# Patient Record
Sex: MALE | Race: OTHER | NOT HISPANIC OR LATINO | URBAN - METROPOLITAN AREA
[De-identification: names, ages, dates, MRNs, and addresses within clinical notes are randomized per-mention and may not be internally consistent; named-entity substitution may affect disease eponyms.]

---

## 2020-10-11 ENCOUNTER — INPATIENT (INPATIENT)
Facility: HOSPITAL | Age: 85
LOS: 4 days | Discharge: HOME | End: 2020-10-16
Attending: INTERNAL MEDICINE | Admitting: INTERNAL MEDICINE
Payer: MEDICARE

## 2020-10-11 VITALS
TEMPERATURE: 97 F | WEIGHT: 139.99 LBS | RESPIRATION RATE: 16 BRPM | HEART RATE: 71 BPM | OXYGEN SATURATION: 97 % | SYSTOLIC BLOOD PRESSURE: 151 MMHG | DIASTOLIC BLOOD PRESSURE: 69 MMHG

## 2020-10-11 LAB
ALBUMIN SERPL ELPH-MCNC: 4.1 G/DL — SIGNIFICANT CHANGE UP (ref 3.5–5.2)
ALP SERPL-CCNC: 69 U/L — SIGNIFICANT CHANGE UP (ref 30–115)
ALT FLD-CCNC: 7 U/L — SIGNIFICANT CHANGE UP (ref 0–41)
ANION GAP SERPL CALC-SCNC: 12 MMOL/L — SIGNIFICANT CHANGE UP (ref 7–14)
AST SERPL-CCNC: 14 U/L — SIGNIFICANT CHANGE UP (ref 0–41)
BASOPHILS # BLD AUTO: 0.04 K/UL — SIGNIFICANT CHANGE UP (ref 0–0.2)
BASOPHILS NFR BLD AUTO: 0.5 % — SIGNIFICANT CHANGE UP (ref 0–1)
BILIRUB SERPL-MCNC: 0.6 MG/DL — SIGNIFICANT CHANGE UP (ref 0.2–1.2)
BUN SERPL-MCNC: 27 MG/DL — HIGH (ref 10–20)
CALCIUM SERPL-MCNC: 9.2 MG/DL — SIGNIFICANT CHANGE UP (ref 8.5–10.1)
CHLORIDE SERPL-SCNC: 106 MMOL/L — SIGNIFICANT CHANGE UP (ref 98–110)
CO2 SERPL-SCNC: 23 MMOL/L — SIGNIFICANT CHANGE UP (ref 17–32)
CREAT SERPL-MCNC: 1.9 MG/DL — HIGH (ref 0.7–1.5)
EOSINOPHIL # BLD AUTO: 0.07 K/UL — SIGNIFICANT CHANGE UP (ref 0–0.7)
EOSINOPHIL NFR BLD AUTO: 0.8 % — SIGNIFICANT CHANGE UP (ref 0–8)
GLUCOSE SERPL-MCNC: 101 MG/DL — HIGH (ref 70–99)
HCT VFR BLD CALC: 42.5 % — SIGNIFICANT CHANGE UP (ref 42–52)
HGB BLD-MCNC: 14.2 G/DL — SIGNIFICANT CHANGE UP (ref 14–18)
IMM GRANULOCYTES NFR BLD AUTO: 0.6 % — HIGH (ref 0.1–0.3)
LYMPHOCYTES # BLD AUTO: 1.73 K/UL — SIGNIFICANT CHANGE UP (ref 1.2–3.4)
LYMPHOCYTES # BLD AUTO: 20.6 % — SIGNIFICANT CHANGE UP (ref 20.5–51.1)
MAGNESIUM SERPL-MCNC: 2.2 MG/DL — SIGNIFICANT CHANGE UP (ref 1.8–2.4)
MCHC RBC-ENTMCNC: 30 PG — SIGNIFICANT CHANGE UP (ref 27–31)
MCHC RBC-ENTMCNC: 33.4 G/DL — SIGNIFICANT CHANGE UP (ref 32–37)
MCV RBC AUTO: 89.7 FL — SIGNIFICANT CHANGE UP (ref 80–94)
MONOCYTES # BLD AUTO: 0.72 K/UL — HIGH (ref 0.1–0.6)
MONOCYTES NFR BLD AUTO: 8.6 % — SIGNIFICANT CHANGE UP (ref 1.7–9.3)
NEUTROPHILS # BLD AUTO: 5.8 K/UL — SIGNIFICANT CHANGE UP (ref 1.4–6.5)
NEUTROPHILS NFR BLD AUTO: 68.9 % — SIGNIFICANT CHANGE UP (ref 42.2–75.2)
NRBC # BLD: 0 /100 WBCS — SIGNIFICANT CHANGE UP (ref 0–0)
NT-PROBNP SERPL-SCNC: 271 PG/ML — SIGNIFICANT CHANGE UP (ref 0–300)
PLATELET # BLD AUTO: 213 K/UL — SIGNIFICANT CHANGE UP (ref 130–400)
POTASSIUM SERPL-MCNC: 5.2 MMOL/L — HIGH (ref 3.5–5)
POTASSIUM SERPL-SCNC: 5.2 MMOL/L — HIGH (ref 3.5–5)
PROT SERPL-MCNC: 6.2 G/DL — SIGNIFICANT CHANGE UP (ref 6–8)
RBC # BLD: 4.74 M/UL — SIGNIFICANT CHANGE UP (ref 4.7–6.1)
RBC # FLD: 13.6 % — SIGNIFICANT CHANGE UP (ref 11.5–14.5)
SODIUM SERPL-SCNC: 141 MMOL/L — SIGNIFICANT CHANGE UP (ref 135–146)
TROPONIN T SERPL-MCNC: <0.01 NG/ML — SIGNIFICANT CHANGE UP
WBC # BLD: 8.41 K/UL — SIGNIFICANT CHANGE UP (ref 4.8–10.8)
WBC # FLD AUTO: 8.41 K/UL — SIGNIFICANT CHANGE UP (ref 4.8–10.8)

## 2020-10-11 PROCEDURE — 93010 ELECTROCARDIOGRAM REPORT: CPT

## 2020-10-11 PROCEDURE — 71250 CT THORAX DX C-: CPT | Mod: 26

## 2020-10-11 PROCEDURE — 99218: CPT | Mod: CS

## 2020-10-11 RX ORDER — TAMSULOSIN HYDROCHLORIDE 0.4 MG/1
0.8 CAPSULE ORAL AT BEDTIME
Refills: 0 | Status: DISCONTINUED | OUTPATIENT
Start: 2020-10-11 | End: 2020-10-14

## 2020-10-11 RX ORDER — ALPRAZOLAM 0.25 MG
0.5 TABLET ORAL ONCE
Refills: 0 | Status: DISCONTINUED | OUTPATIENT
Start: 2020-10-11 | End: 2020-10-11

## 2020-10-11 RX ORDER — ZOLPIDEM TARTRATE 10 MG/1
5 TABLET ORAL AT BEDTIME
Refills: 0 | Status: DISCONTINUED | OUTPATIENT
Start: 2020-10-11 | End: 2020-10-14

## 2020-10-11 RX ORDER — SODIUM CHLORIDE 9 MG/ML
1000 INJECTION INTRAMUSCULAR; INTRAVENOUS; SUBCUTANEOUS ONCE
Refills: 0 | Status: COMPLETED | OUTPATIENT
Start: 2020-10-11 | End: 2020-10-11

## 2020-10-11 RX ADMIN — SODIUM CHLORIDE 100 MILLILITER(S): 9 INJECTION INTRAMUSCULAR; INTRAVENOUS; SUBCUTANEOUS at 20:00

## 2020-10-11 NOTE — ED PROVIDER NOTE - ATTENDING CONTRIBUTION TO CARE
Patient states that he was at the dinner table with family, suddenly passed out, witnessed by family, no fall. no seizure like activity, no incontinence. Patient is asymptomatic in ED.   Vitals noted  Patient is awake, alert, o x 3, resting comfortably on the stretcher, is asymptomatic.   No external signs of trauma noted.   lungs: CTA  abd: +BS, NT, ND, soft  CNS: awake, alert, o x 3, no focal neurologic deficits  A/P: Syncope  labs, EKG, CXR  reevaluation

## 2020-10-11 NOTE — ED ADULT NURSE NOTE - OBJECTIVE STATEMENT
BIBA from home with granddaughter s/p syncopal episode. As per granddaughter, "he looked pale and started looking like he was going to pass out. He said he didn't and that he remembers everything. He said he was just sleeping." Denies LOC, fall, chest pain, SOB, f/n/v/d.

## 2020-10-11 NOTE — ED CDU PROVIDER INITIAL DAY NOTE - OBJECTIVE STATEMENT
86 yo M with PMHx of HTN, CAD s/p PCI x 3 on Plavix, BPH, and HLD presents to the ED BIB EMS for evaluation of syncope. Pt was sitting at dinner and suddenly passed out. According to family pt was unconscious for about 5 minutes and when he woke up he was confused for another few minutes. When EMS arrived pt was hypotensive to 70s. When pt arrived to ED he was at baseline and BP improved. Pt currently denies any complaints. Pt denies fever, chills, nausea, vomiting, abdominal pain, diarrhea, headache, dizziness, weakness, chest pain, SOB, back pain, trauma, urinary symptoms, cough, calf pain/swelling, recent travel, recent surgery.

## 2020-10-11 NOTE — ED PROVIDER NOTE - NS ED ROS FT
Review of Systems:  	•	CONSTITUTIONAL: no fever, no diaphoresis, no chills  	•	SKIN: no rash  	•	EYES: no eye pain, no blurry vision  	•	ENT: no change in hearing, no tinnitus   	•	RESPIRATORY: no shortness of breath, no cough  	•	CARDIAC: no chest pain, no palpitations  	•	GI: no abd pain, no nausea, no vomiting, no diarrhea  	•	GENITO-URINARY: no dysuria; no hematuria, no increased urinary frequency  	•	MUSCULOSKELETAL: no joint paint, no swelling, no redness  	•	NEUROLOGIC: +syncope, no head injury

## 2020-10-11 NOTE — ED CLERICAL - NSCLERICAL TASK_GEN_ALL_ED
Other Documents/OBS/MG Problem: Fall Risk (Adult)  Goal: Absence of Falls  Patient will demonstrate the desired outcomes by discharge/transition of care.   Outcome: Ongoing (interventions implemented as appropriate)  Bed in lowest position, bed rails up x3, call light within reach. Pt free from falls during shift.    Problem: Patient Care Overview  Goal: Plan of Care Review  Outcome: Ongoing (interventions implemented as appropriate)  Patient is AAO to self and place, vital signs have been stable, plan of care reviewed with patient. Will continue to monitor.    Problem: Pressure Ulcer Risk (Ovidio Scale) (Adult,Obstetrics,Pediatric)  Goal: Identify Related Risk Factors and Signs and Symptoms  Related risk factors and signs and symptoms are identified upon initiation of Human Response Clinical Practice Guideline (CPG)   Outcome: Ongoing (interventions implemented as appropriate)  Pt is able to position self in bed.

## 2020-10-11 NOTE — ED PROVIDER NOTE - OBJECTIVE STATEMENT
85 year old male, CAD s/p 3 stents, HTN, HDL, GERD, who presents s/p syncope. Patient w/ episode of syncope x1 hour PTA, as per family patient was at dinner table when he had +LOC, did not fall to ground. Episode lasting ~1 minute, no postictal period, no urinary/bowel incontinence. patient states he recalls sitting at dinner table followed by waking up on ground. No recent illness, CP, SOB, abd pain, nausea, vomiting, diarrhea. Patient in ED w/o complaints. Patient follows w cardiology in [place], last stress test >2 years ago.

## 2020-10-11 NOTE — ED CDU PROVIDER INITIAL DAY NOTE - PHYSICAL EXAMINATION
VITAL SIGNS: I have reviewed nursing notes and confirm.  CONSTITUTIONAL: Elderly male laying on stretcher in NAD.   SKIN: Skin exam is warm and dry, no acute rash.  HEAD: Normocephalic; atraumatic.  EYES: PERRL, EOM intact; conjunctiva and sclera clear.  ENT: No nasal discharge; airway clear.   NECK: Supple; non tender.  CARD: S1, S2 normal; no murmurs, gallops, or rubs. Regular rate and rhythm.  RESP: No wheezes, rales or rhonchi. Speaking in full sentences.   ABD: Normal bowel sounds; soft; non-distended; non-tender; No rebound or guarding.   EXT: Normal ROM. No calf TTP or swelling.   NEURO: Alert, oriented. Grossly unremarkable. No focal deficits. CN II-XII intact. No dysmetria. No ataxia. Sensation intact and equal throughout. Strength 5/5 throughout. Gait steady.

## 2020-10-11 NOTE — ED CDU PROVIDER INITIAL DAY NOTE - MEDICAL DECISION MAKING DETAILS
Patient to remain on continuous telemetry.  For repeat cardiac enzymes and repeat EKG.  Consider inpatient workup vs. continued EDOU stay.

## 2020-10-11 NOTE — ED CDU PROVIDER INITIAL DAY NOTE - NS ED ROS FT
Review of Systems  Constitutional:  No fever, chills.  Eyes:  No visual changes, eye pain, or discharge.  ENMT:  No hearing changes, pain, or discharge. No nasal congestion, discharge, or bleeding. No throat pain, swelling, or difficulty swallowing.  Cardiac:  No chest pain, palpitations, or edema. (+) syncope  Respiratory:  No dyspnea, cough. No hemoptysis.  GI:  No nausea, vomiting, diarrhea, or abdominal pain.   :  No dysuria, hematuria, frequency, or burning.   MS:  No back pain.  Skin:  No skin rash, pruritis, jaundice, or lesions.  Neuro:  No headache, dizziness, loss of sensation, or focal weakness.  No change in mental status.

## 2020-10-11 NOTE — ED PROVIDER NOTE - CLINICAL SUMMARY MEDICAL DECISION MAKING FREE TEXT BOX
Patient remained asymptomatic in ED, remained in NSR. Patient is admitted to EDOU under 2 midnights for further evaluation of syncope.

## 2020-10-11 NOTE — ED PROVIDER NOTE - PHYSICAL EXAMINATION
CONSTITUTIONAL: Elderly appearing M. Well-developed; well-nourished; in no acute distress, nontoxic appearing  SKIN: skin exam is warm and dry  HEAD: Normocephalic; atraumatic.  EYES: PERRL, conjunctiva and sclera clear.  ENT: MMM, no nasal congestion  NECK: Supple; non tender.  ROM intact.  CARD: S1, S2 normal, no murmur  RESP: No wheezes, rales or rhonchi. Good air movement bilaterally  ABD: soft; non-distended; non-tender. No Rebound, No guarding  EXT: Normal ROM.    NEURO: awake, alert, following commands, oriented, grossly unremarkable. No Focal deficits. GCS 15. CN 2-12 intact. Negative pronator.   PSYCH: Cooperative, appropriate.

## 2020-10-11 NOTE — ED ADULT NURSE NOTE - CHPI ED NUR SYMPTOMS NEG
no vomiting/no diaphoresis/no back pain/no congestion/no dizziness/no nausea/no shortness of breath/no chest pain/no chills/no fever

## 2020-10-12 DIAGNOSIS — Z95.5 PRESENCE OF CORONARY ANGIOPLASTY IMPLANT AND GRAFT: Chronic | ICD-10-CM

## 2020-10-12 LAB
A1C WITH ESTIMATED AVERAGE GLUCOSE RESULT: 5.8 % — HIGH (ref 4–5.6)
ANION GAP SERPL CALC-SCNC: 10 MMOL/L — SIGNIFICANT CHANGE UP (ref 7–14)
BUN SERPL-MCNC: 27 MG/DL — HIGH (ref 10–20)
CALCIUM SERPL-MCNC: 8.8 MG/DL — SIGNIFICANT CHANGE UP (ref 8.5–10.1)
CHLORIDE SERPL-SCNC: 107 MMOL/L — SIGNIFICANT CHANGE UP (ref 98–110)
CHOLEST SERPL-MCNC: 139 MG/DL — SIGNIFICANT CHANGE UP (ref 100–200)
CO2 SERPL-SCNC: 22 MMOL/L — SIGNIFICANT CHANGE UP (ref 17–32)
CREAT SERPL-MCNC: 1.5 MG/DL — SIGNIFICANT CHANGE UP (ref 0.7–1.5)
ESTIMATED AVERAGE GLUCOSE: 120 MG/DL — HIGH (ref 68–114)
GLUCOSE SERPL-MCNC: 95 MG/DL — SIGNIFICANT CHANGE UP (ref 70–99)
HDLC SERPL-MCNC: 39 MG/DL — LOW
LIPID PNL WITH DIRECT LDL SERPL: 94 MG/DL — SIGNIFICANT CHANGE UP (ref 4–129)
POTASSIUM SERPL-MCNC: 4.4 MMOL/L — SIGNIFICANT CHANGE UP (ref 3.5–5)
POTASSIUM SERPL-SCNC: 4.4 MMOL/L — SIGNIFICANT CHANGE UP (ref 3.5–5)
RAPID RVP RESULT: SIGNIFICANT CHANGE UP
SARS-COV-2 RNA SPEC QL NAA+PROBE: SIGNIFICANT CHANGE UP
SODIUM SERPL-SCNC: 139 MMOL/L — SIGNIFICANT CHANGE UP (ref 135–146)
TOTAL CHOLESTEROL/HDL RATIO MEASUREMENT: 3.6 RATIO — LOW (ref 4–5.5)
TRIGL SERPL-MCNC: 56 MG/DL — SIGNIFICANT CHANGE UP (ref 10–149)
TROPONIN T SERPL-MCNC: <0.01 NG/ML — SIGNIFICANT CHANGE UP

## 2020-10-12 PROCEDURE — 93306 TTE W/DOPPLER COMPLETE: CPT | Mod: 26

## 2020-10-12 PROCEDURE — 93010 ELECTROCARDIOGRAM REPORT: CPT

## 2020-10-12 PROCEDURE — 99217: CPT | Mod: CS

## 2020-10-12 RX ORDER — AMLODIPINE BESYLATE 2.5 MG/1
5 TABLET ORAL DAILY
Refills: 0 | Status: DISCONTINUED | OUTPATIENT
Start: 2020-10-12 | End: 2020-10-14

## 2020-10-12 RX ORDER — PANTOPRAZOLE SODIUM 20 MG/1
1 TABLET, DELAYED RELEASE ORAL
Qty: 0 | Refills: 0 | DISCHARGE

## 2020-10-12 RX ORDER — ALPRAZOLAM 0.25 MG
0.5 TABLET ORAL THREE TIMES A DAY
Refills: 0 | Status: DISCONTINUED | OUTPATIENT
Start: 2020-10-12 | End: 2020-10-14

## 2020-10-12 RX ORDER — METOPROLOL TARTRATE 50 MG
1 TABLET ORAL
Qty: 0 | Refills: 0 | DISCHARGE

## 2020-10-12 RX ORDER — OMEGA-3 ACID ETHYL ESTERS 1 G
2 CAPSULE ORAL
Refills: 0 | Status: DISCONTINUED | OUTPATIENT
Start: 2020-10-12 | End: 2020-10-16

## 2020-10-12 RX ORDER — FOLIC ACID 0.8 MG
1 TABLET ORAL
Qty: 0 | Refills: 0 | DISCHARGE

## 2020-10-12 RX ORDER — CLOPIDOGREL BISULFATE 75 MG/1
75 TABLET, FILM COATED ORAL DAILY
Refills: 0 | Status: DISCONTINUED | OUTPATIENT
Start: 2020-10-12 | End: 2020-10-16

## 2020-10-12 RX ORDER — PANTOPRAZOLE SODIUM 20 MG/1
40 TABLET, DELAYED RELEASE ORAL
Refills: 0 | Status: DISCONTINUED | OUTPATIENT
Start: 2020-10-12 | End: 2020-10-14

## 2020-10-12 RX ORDER — FOLIC ACID 0.8 MG
1 TABLET ORAL DAILY
Refills: 0 | Status: DISCONTINUED | OUTPATIENT
Start: 2020-10-12 | End: 2020-10-14

## 2020-10-12 RX ORDER — CLOPIDOGREL BISULFATE 75 MG/1
1 TABLET, FILM COATED ORAL
Qty: 0 | Refills: 0 | DISCHARGE

## 2020-10-12 RX ORDER — LOSARTAN POTASSIUM 100 MG/1
1 TABLET, FILM COATED ORAL
Qty: 0 | Refills: 0 | DISCHARGE

## 2020-10-12 RX ORDER — OMEGA-3 ACID ETHYL ESTERS 1 G
1 CAPSULE ORAL
Qty: 0 | Refills: 0 | DISCHARGE

## 2020-10-12 RX ORDER — ATORVASTATIN CALCIUM 80 MG/1
1 TABLET, FILM COATED ORAL
Qty: 0 | Refills: 0 | DISCHARGE

## 2020-10-12 RX ORDER — METOPROLOL TARTRATE 50 MG
50 TABLET ORAL DAILY
Refills: 0 | Status: DISCONTINUED | OUTPATIENT
Start: 2020-10-12 | End: 2020-10-16

## 2020-10-12 RX ORDER — SODIUM CHLORIDE 9 MG/ML
1000 INJECTION INTRAMUSCULAR; INTRAVENOUS; SUBCUTANEOUS ONCE
Refills: 0 | Status: COMPLETED | OUTPATIENT
Start: 2020-10-12 | End: 2020-10-12

## 2020-10-12 RX ORDER — CITALOPRAM 10 MG/1
1 TABLET, FILM COATED ORAL
Qty: 0 | Refills: 0 | DISCHARGE

## 2020-10-12 RX ORDER — SODIUM CHLORIDE 9 MG/ML
1000 INJECTION INTRAMUSCULAR; INTRAVENOUS; SUBCUTANEOUS
Refills: 0 | Status: DISCONTINUED | OUTPATIENT
Start: 2020-10-12 | End: 2020-10-15

## 2020-10-12 RX ORDER — CITALOPRAM 10 MG/1
20 TABLET, FILM COATED ORAL DAILY
Refills: 0 | Status: DISCONTINUED | OUTPATIENT
Start: 2020-10-12 | End: 2020-10-16

## 2020-10-12 RX ORDER — CHLORHEXIDINE GLUCONATE 213 G/1000ML
1 SOLUTION TOPICAL
Refills: 0 | Status: DISCONTINUED | OUTPATIENT
Start: 2020-10-12 | End: 2020-10-16

## 2020-10-12 RX ORDER — HEPARIN SODIUM 5000 [USP'U]/ML
5000 INJECTION INTRAVENOUS; SUBCUTANEOUS EVERY 8 HOURS
Refills: 0 | Status: DISCONTINUED | OUTPATIENT
Start: 2020-10-12 | End: 2020-10-16

## 2020-10-12 RX ORDER — ATORVASTATIN CALCIUM 80 MG/1
10 TABLET, FILM COATED ORAL AT BEDTIME
Refills: 0 | Status: DISCONTINUED | OUTPATIENT
Start: 2020-10-12 | End: 2020-10-16

## 2020-10-12 RX ADMIN — Medication 1 MILLIGRAM(S): at 16:39

## 2020-10-12 RX ADMIN — Medication 50 MILLIGRAM(S): at 16:39

## 2020-10-12 RX ADMIN — SODIUM CHLORIDE 500 MILLILITER(S): 9 INJECTION INTRAMUSCULAR; INTRAVENOUS; SUBCUTANEOUS at 01:55

## 2020-10-12 RX ADMIN — CLOPIDOGREL BISULFATE 75 MILLIGRAM(S): 75 TABLET, FILM COATED ORAL at 16:39

## 2020-10-12 RX ADMIN — ZOLPIDEM TARTRATE 5 MILLIGRAM(S): 10 TABLET ORAL at 00:38

## 2020-10-12 RX ADMIN — TAMSULOSIN HYDROCHLORIDE 0.8 MILLIGRAM(S): 0.4 CAPSULE ORAL at 22:32

## 2020-10-12 RX ADMIN — ZOLPIDEM TARTRATE 5 MILLIGRAM(S): 10 TABLET ORAL at 22:32

## 2020-10-12 RX ADMIN — Medication 0.5 MILLIGRAM(S): at 00:38

## 2020-10-12 RX ADMIN — CITALOPRAM 20 MILLIGRAM(S): 10 TABLET, FILM COATED ORAL at 16:40

## 2020-10-12 RX ADMIN — Medication 0.5 MILLIGRAM(S): at 19:46

## 2020-10-12 RX ADMIN — ATORVASTATIN CALCIUM 10 MILLIGRAM(S): 80 TABLET, FILM COATED ORAL at 22:33

## 2020-10-12 RX ADMIN — AMLODIPINE BESYLATE 5 MILLIGRAM(S): 2.5 TABLET ORAL at 16:39

## 2020-10-12 RX ADMIN — Medication 0.5 MILLIGRAM(S): at 22:33

## 2020-10-12 RX ADMIN — TAMSULOSIN HYDROCHLORIDE 0.8 MILLIGRAM(S): 0.4 CAPSULE ORAL at 00:38

## 2020-10-12 NOTE — H&P ADULT - HISTORY OF PRESENT ILLNESS
85 year old male, CAD s/p 3 stents, HTN, HDL, emphysema, asbestos, exposure, GERD, who presents s/p syncope 19:00 on 10/11. Per patient he was sitting at dinner table and lost consciousness for 1 minute. He had no preceding symptoms at all, and when he woke up he felt "weak" with no other symptoms. He states the EMTs said his "blood pressure was low". He recalls sitting at table and then waking up surrounded by people. His family witnessed the whole episode; they state he suddenly did not answer them and slumped in his chair. They report no seizure activity, posttictal state, no urinary/bowel incontinence.   Of note. he reports another episode of dizziness that he felt yesterday afternoon that resolved spontaneously after 2 minutes. No recent illness, CP, SOB, abd pain, nausea, vomiting, diarrhea. Patient in ED w/o complaints. Patient follows w cardiology in , last stress test >2 years ago. 85 year old male, CAD s/p 3 stents, HTN, HDL, emphysema, asbestos, exposure, GERD, depression, who presents s/p syncope 19:00 on 10/11. Per patient he was sitting at dinner table and lost consciousness for 1 minute. He had no preceding symptoms at all, and when he woke up he felt "weak" with no other symptoms. He states the EMTs said his "blood pressure was low". He recalls sitting at table and then waking up surrounded by people. His family witnessed the whole episode; they state he suddenly did not answer them and slumped in his chair. They report no seizure activity, posttictal state, no urinary/bowel incontinence.   Of note, he reports another episode of dizziness that he felt yesterday afternoon that resolved spontaneously after 2 minutes.   He denies recent illness, CP, SOB, abd pain, nausea, vomiting, diarrhea at any time.    He has had 3 stents; the latest was mid 2018. He sees a cardiologist in [facility] who he does not remember the name; the last time he saw him was in February or March.    ED course: Vitals remained WNL. EKGs showed NSR. CT showed emphysema and asbestos plaques. He received 2 L NS and is admitted for syncopal w/u. 85 year old male, CAD s/p 3 stents, HTN, HDL, emphysema, asbestos exposure, GERD, depression, who presents s/p syncope 19:00 on 10/11. Per patient he was sitting at dinner table and lost consciousness for 1 minute. He had no preceding symptoms at all, and when he woke up he felt "weak" with no other symptoms. He states the EMTs said his "blood pressure was low". He recalls sitting at table and then waking up surrounded by people. His family witnessed the whole episode; they state he suddenly did not answer them and slumped in his chair. They report no seizure activity, posttictal state, no urinary/bowel incontinence.   Of note, he reports another episode of dizziness that he felt yesterday afternoon that resolved spontaneously after 2 minutes.   He denies recent illness, CP, SOB, abd pain, nausea, vomiting, diarrhea at any time.    He has had 3 stents; the latest was mid 2018. He sees a cardiologist in [facility] who he does not remember the name; the last time he saw him was in February or March.    ED course: Vitals remained WNL. EKGs showed NSR. CT showed emphysema and asbestos plaques. He received 2 L NS and is admitted for syncopal w/u.

## 2020-10-12 NOTE — ED CDU PROVIDER DISPOSITION NOTE - CLINICAL COURSE
85 year old male, CAD s/p 3 stents, HTN, HDL, GERD, who presents s/p episode of non-exertional syncope. Patient had witnessed episode of syncope at dinner table, passed out for several minutes, no prodromal symptoms. No injuries/trauma. Episode lasted about 1 minute, no postictal period, no urinary/bowel incontinence. Patient states he recalls sitting at dinner table followed by waking up on ground. No recent illness, CP, SOB, abd pain, nausea, vomiting, diarrhea. Patient in ED w/o complaints. Patient follows w cardiology in , last stress test >2 years ago. Had mildly elevated Cr of 1.9, downtrended to 1.5 after IVF. Serial trops negative, EKG with no acute interval changes. Echo showed LVH and sclerotic aortic valve. Hemodynamically stable, currently asymptomatic. Informed patient's granddaughter Louisa of all lab/radiology results, she acted as  as patient is predominantly [place] speaking.

## 2020-10-12 NOTE — H&P ADULT - ATTENDING COMMENTS
Patient seen and examined independently. Agree with resident note/ history / physical exam and plan of care with following exceptions/additions/updates. Case discussed with patient/pt decision maker, house-staff and nursing.     pt wants his granddaughter Ana ( she is an RN ) to translate for him, does not want  phone. called ana on the phone.   pt is feeling better. no pain or dizziness  dw EP,  plan is cardio consult, cont tele, and loop recorder prior to dc ( probably in am ) ,

## 2020-10-12 NOTE — H&P ADULT - ASSESSMENT
85 year old male, CAD s/p 3 stents, HTN, HDL, emphysema, asbestos, exposure, GERD, who presents s/p syncope 19:00 on 10/11. Per patient he was sitting at dinner table and lost consciousness for 1 minute. He had no preceding symptoms at all, and when he woke up he felt "weak" with no other symptoms. He states the EMTs said his "blood pressure was low". He recalls sitting at table and then waking up surrounded by people. His family witnessed the whole episode; they state he suddenly did not answer them and slumped in his chair. They report no seizure activity, posttictal state, no urinary/bowel incontinence.   Of note, he reports another episode of dizziness that he felt yesterday afternoon that resolved spontaneously after 2 minutes.   He denies recent illness, CP, SOB, abd pain, nausea, vomiting, diarrhea at any time.    ED course: Vitals remained WNL. EKGs showed NSR. CT showed emphysema and asbestos plaques. He received 2 L NS and is admitted for syncopal w/u. 85 year old male, CAD s/p 3 stents, HTN, HDL, emphysema, asbestos exposure, GERD, depression, who presents s/p syncope 19:00 on 10/11. he had no associated symptoms aside for a short episode of dizziness a few hours prior; episode was fully witnessed by family who reported no seizure-like activity.  In ED EKGs showed NSR. CT showed emphysema and asbestos plaques. He received 2 L NS and was admitted for syncopal w/u.      # Syncope. Given lack of associated symptoms and significant cardiac history (3 stents, HLD, HTN) most likely cardiac etiology  - 3 EKGs show NSR, 2 negative trops  - Obtain lipid panel, HA1C  - Admit to tele  - EP consult for possible loop recorder  - Will hold off on EEG as episode was fully witnessed with no signs of seizure activity, plus extensive cardiac history    # FORREST  - On admission Cr was 1.9, after 2 L NS now 1.5; given improvement after fluids likely prerenal 2/2 dehydration  - Will start light hydration  - Hold off on nephrotoxic drugs (e.g. losartan)  - Will monitor    # HTN  - Continue toprol, amlodipine.   - Hold losartan until FORREST resolves    # ACS  - Continue Plavix    # HDL  - Continue atorvastatin    # Depression  - Continue citalopram, lorazepam. Continue Xanax PRN    # GERD  - Continue PPI    # Emphysema  - Radiology recommends f/u CT in 3 months if no prior CT      DVT PPX: Heparin subq  GI PPX: protonix  Diet: DASH  Dispo: From home, pending EP eval 85 year old male, CAD s/p 3 stents, HTN, HDL, emphysema, asbestos exposure, GERD, depression, who presents s/p syncope 19:00 on 10/11. he had no associated symptoms aside for a short episode of dizziness a few hours prior; episode was fully witnessed by family who reported no seizure-like activity.  In ED EKGs showed NSR. CT showed emphysema and asbestos plaques. He received 2 L NS and was admitted for syncopal w/u.      # Syncope. Given lack of associated symptoms and significant cardiac history (3 stents, HLD, HTN) most likely cardiac etiology  - 3 EKGs show NSR, 2 negative trops  - Obtain lipid panel, HA1C  - Admit to tele  - EP consult for possible loop recorder  - Obtain orthostatics  - Will hold off on EEG as episode was fully witnessed with no signs of seizure activity, plus extensive cardiac history    # FORREST  - On admission Cr was 1.9, after 2 L NS now 1.5; given improvement after fluids likely prerenal 2/2 dehydration  - Will start light hydration  - Hold off on nephrotoxic drugs (e.g. losartan)  - Will monitor    # HTN  - Continue toprol, amlodipine.   - Hold losartan until FORREST resolves    # ACS  - Continue Plavix    # HDL  - Continue atorvastatin    # Depression  - Continue citalopram, lorazepam. Continue Xanax PRN    # GERD  - Continue PPI    # Emphysema  - Radiology recommends f/u CT in 3 months if no prior CT      DVT PPX: Heparin subq  GI PPX: protonix  Diet: DASH  Dispo: From home, pending EP eval 85 year old male, CAD s/p 3 stents, HTN, HDL, emphysema, asbestos exposure, GERD, depression, who presents s/p syncope 19:00 on 10/11. he had no associated symptoms aside for a short episode of dizziness a few hours prior; episode was fully witnessed by family who reported no seizure-like activity.  In ED EKGs showed NSR. CT showed emphysema and asbestos plaques. He received 2 L NS and was admitted for syncopal w/u.      # Syncope. Given lack of associated symptoms and significant cardiac history (3 stents, HLD, HTN) most likely cardiac etiology  - 3 EKGs show NSR, 2 negative trops  - Echo showed 60-65% EF with grade I diastolic dysfunction  - Obtain lipid panel, HA1C  - Admit to tele  - EP consult for possible loop recorder  - Obtain orthostatics  - Will hold off on EEG as episode was fully witnessed with no signs of seizure activity, plus extensive cardiac history    # FORREST  - On admission Cr was 1.9, after 2 L NS now 1.5; given improvement after fluids likely prerenal 2/2 dehydration  - Will start light hydration  - Hold off on nephrotoxic drugs (e.g. losartan)  - Will monitor    # HTN  - Continue toprol, amlodipine.   - Hold losartan until FORREST resolves    # ACS  - Continue Plavix    # HDL  - Continue atorvastatin    # Depression  - Continue citalopram, lorazepam. Continue Xanax PRN    # GERD  - Continue PPI    # Emphysema  - Radiology recommends f/u CT in 3 months if no prior CT      DVT PPX: Heparin subq  GI PPX: protonix  Diet: DASH  Dispo: From home, pending EP eval

## 2020-10-12 NOTE — H&P ADULT - NSHPSOCIALHISTORY_GEN_ALL_CORE
Quit smoking 30 years ago  Drinks socially  No illicit drug use  Lives at home with wife, where he is completely functional

## 2020-10-12 NOTE — H&P ADULT - NSHPPHYSICALEXAM_GEN_ALL_CORE
Vital Signs Last 24 Hrs  T(C): 36.6 (12 Oct 2020 07:27), Max: 36.6 (12 Oct 2020 07:27)  T(F): 97.8 (12 Oct 2020 07:27), Max: 97.8 (12 Oct 2020 07:27)  HR: 71 (12 Oct 2020 07:27) (71 - 82)  BP: 162/70 (12 Oct 2020 07:27) (151/69 - 162/70)  RR: 16 (11 Oct 2020 17:50) (16 - 16)  SpO2: 97% (11 Oct 2020 17:50) (97% - 97%)        GENERAL: NAD, lying in bed comfortably  HEAD:  Atraumatic, Normocephalic  EYES: EOMI, PERRLA, conjunctiva and sclera clear  ENT: Moist mucous membranes  CHEST/LUNG: Clear to auscultation bilaterally; No rales, rhonchi, wheezing, or rubs. Unlabored respirations  HEART: Regular rate and rhythm; No murmurs, rubs, or gallops  ABDOMEN: Bowel sounds present; Soft, Nontender, Nondistended. No hepatomegally  EXTREMITIES:  2+ Peripheral Pulses, brisk capillary refill. No clubbing, cyanosis, or edema  NERVOUS SYSTEM:  Alert & Oriented X3, speech clear. No deficits   MSK: FROM all 4 extremities, full and equal strength

## 2020-10-12 NOTE — H&P ADULT - NSHPLABSRESULTS_GEN_ALL_CORE
LABS:  cret                        14.2   8.41  )-----------( 213      ( 11 Oct 2020 18:41 )             42.5     10-12    139  |  107  |  27<H>  ----------------------------<  95  4.4   |  22  |  1.5    Ca    8.8      12 Oct 2020 06:35  Mg     2.2     10-11    TPro  6.2  /  Alb  4.1  /  TBili  0.6  /  DBili  x   /  AST  14  /  ALT  7   /  AlkPhos  69  10-11            < from: 12 Lead ECG (10.12.20 @ 01:50) >    Diagnosis Line Normal sinus rhythm  Normal ECG    < end of copied text >      < from: CT Chest No Cont (10.11.20 @ 21:43) >    IMPRESSION:    No pneumothorax.  Centrilobular emphysema.  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  Curvilinear opacity/consolidative change in the left lower lobe has the appearance favoring rounded atelectasis. Given no prior studies for comparison, 3 month follow-up is recommended to ensure stability.  Main pulmonary artery dilated to 3.4 cm which can be seen in pulmonary artery hypertension.  No additional evidence for focal opacity or acute pathology.    < end of copied text >

## 2020-10-12 NOTE — H&P ADULT - NSICDXPASTMEDICALHX_GEN_ALL_CORE_FT
PAST MEDICAL HISTORY:  Chronic GERD     Coronary artery disease     Depression     Hyperlipidemia, group A     Hypertension

## 2020-10-13 LAB
ANION GAP SERPL CALC-SCNC: 9 MMOL/L — SIGNIFICANT CHANGE UP (ref 7–14)
BASOPHILS # BLD AUTO: 0.07 K/UL — SIGNIFICANT CHANGE UP (ref 0–0.2)
BASOPHILS NFR BLD AUTO: 0.9 % — SIGNIFICANT CHANGE UP (ref 0–1)
BUN SERPL-MCNC: 22 MG/DL — HIGH (ref 10–20)
CALCIUM SERPL-MCNC: 8.4 MG/DL — LOW (ref 8.5–10.1)
CHLORIDE SERPL-SCNC: 107 MMOL/L — SIGNIFICANT CHANGE UP (ref 98–110)
CO2 SERPL-SCNC: 22 MMOL/L — SIGNIFICANT CHANGE UP (ref 17–32)
CREAT SERPL-MCNC: 1.3 MG/DL — SIGNIFICANT CHANGE UP (ref 0.7–1.5)
EOSINOPHIL # BLD AUTO: 0.14 K/UL — SIGNIFICANT CHANGE UP (ref 0–0.7)
EOSINOPHIL NFR BLD AUTO: 1.7 % — SIGNIFICANT CHANGE UP (ref 0–8)
GLUCOSE SERPL-MCNC: 100 MG/DL — HIGH (ref 70–99)
HCT VFR BLD CALC: 38.7 % — LOW (ref 42–52)
HGB BLD-MCNC: 13.1 G/DL — LOW (ref 14–18)
IMM GRANULOCYTES NFR BLD AUTO: 0.5 % — HIGH (ref 0.1–0.3)
LYMPHOCYTES # BLD AUTO: 1.64 K/UL — SIGNIFICANT CHANGE UP (ref 1.2–3.4)
LYMPHOCYTES # BLD AUTO: 20 % — LOW (ref 20.5–51.1)
MCHC RBC-ENTMCNC: 30 PG — SIGNIFICANT CHANGE UP (ref 27–31)
MCHC RBC-ENTMCNC: 33.9 G/DL — SIGNIFICANT CHANGE UP (ref 32–37)
MCV RBC AUTO: 88.6 FL — SIGNIFICANT CHANGE UP (ref 80–94)
MONOCYTES # BLD AUTO: 0.75 K/UL — HIGH (ref 0.1–0.6)
MONOCYTES NFR BLD AUTO: 9.1 % — SIGNIFICANT CHANGE UP (ref 1.7–9.3)
NEUTROPHILS # BLD AUTO: 5.57 K/UL — SIGNIFICANT CHANGE UP (ref 1.4–6.5)
NEUTROPHILS NFR BLD AUTO: 67.8 % — SIGNIFICANT CHANGE UP (ref 42.2–75.2)
NRBC # BLD: 0 /100 WBCS — SIGNIFICANT CHANGE UP (ref 0–0)
PLATELET # BLD AUTO: 198 K/UL — SIGNIFICANT CHANGE UP (ref 130–400)
POTASSIUM SERPL-MCNC: 3.9 MMOL/L — SIGNIFICANT CHANGE UP (ref 3.5–5)
POTASSIUM SERPL-SCNC: 3.9 MMOL/L — SIGNIFICANT CHANGE UP (ref 3.5–5)
RBC # BLD: 4.37 M/UL — LOW (ref 4.7–6.1)
RBC # FLD: 13.6 % — SIGNIFICANT CHANGE UP (ref 11.5–14.5)
SODIUM SERPL-SCNC: 138 MMOL/L — SIGNIFICANT CHANGE UP (ref 135–146)
WBC # BLD: 8.21 K/UL — SIGNIFICANT CHANGE UP (ref 4.8–10.8)
WBC # FLD AUTO: 8.21 K/UL — SIGNIFICANT CHANGE UP (ref 4.8–10.8)

## 2020-10-13 PROCEDURE — 99223 1ST HOSP IP/OBS HIGH 75: CPT

## 2020-10-13 RX ADMIN — Medication 2 GRAM(S): at 06:36

## 2020-10-13 RX ADMIN — AMLODIPINE BESYLATE 5 MILLIGRAM(S): 2.5 TABLET ORAL at 06:36

## 2020-10-13 RX ADMIN — Medication 50 MILLIGRAM(S): at 06:36

## 2020-10-13 RX ADMIN — SODIUM CHLORIDE 50 MILLILITER(S): 9 INJECTION INTRAMUSCULAR; INTRAVENOUS; SUBCUTANEOUS at 06:37

## 2020-10-13 RX ADMIN — Medication 2 GRAM(S): at 17:42

## 2020-10-13 RX ADMIN — Medication 0.5 MILLIGRAM(S): at 22:43

## 2020-10-13 RX ADMIN — CITALOPRAM 20 MILLIGRAM(S): 10 TABLET, FILM COATED ORAL at 11:26

## 2020-10-13 RX ADMIN — CLOPIDOGREL BISULFATE 75 MILLIGRAM(S): 75 TABLET, FILM COATED ORAL at 11:23

## 2020-10-13 RX ADMIN — HEPARIN SODIUM 5000 UNIT(S): 5000 INJECTION INTRAVENOUS; SUBCUTANEOUS at 06:36

## 2020-10-13 RX ADMIN — Medication 1 MILLIGRAM(S): at 11:26

## 2020-10-13 RX ADMIN — TAMSULOSIN HYDROCHLORIDE 0.8 MILLIGRAM(S): 0.4 CAPSULE ORAL at 22:44

## 2020-10-13 RX ADMIN — ZOLPIDEM TARTRATE 5 MILLIGRAM(S): 10 TABLET ORAL at 22:43

## 2020-10-13 RX ADMIN — ATORVASTATIN CALCIUM 10 MILLIGRAM(S): 80 TABLET, FILM COATED ORAL at 22:44

## 2020-10-13 RX ADMIN — PANTOPRAZOLE SODIUM 40 MILLIGRAM(S): 20 TABLET, DELAYED RELEASE ORAL at 06:36

## 2020-10-13 RX ADMIN — HEPARIN SODIUM 5000 UNIT(S): 5000 INJECTION INTRAVENOUS; SUBCUTANEOUS at 22:45

## 2020-10-13 NOTE — CONSULT NOTE ADULT - ASSESSMENT
85 year old male, CAD s/p 3 stents, HTN, HDL, emphysema, asbestos exposure, GERD, depression, who presents s/p syncope 19:00 on 10/11. he had no associated symptoms aside for a short episode of dizziness a few hours prior; episode was fully witnessed by family who reported no seizure-like activity.    Impression:  Syncope  CAD sp PCI ( outside records)  HTN  HLD  Emphysema    Plan:  - Cont tele monitoring  - Please check orthostatic VS  - Check TSH, free T4  - no concern for ischemia / nl ECG AND negative troponins  - echo shows normal EF, no wall motion abnormalities   85 year old male, CAD s/p 3 stents, HTN, HDL, emphysema, asbestos exposure, GERD, depression, who presents s/p syncope 19:00 on 10/11. he had no associated symptoms aside for a short episode of dizziness a few hours prior; episode was fully witnessed by family who reported no seizure-like activity.    Impression:  Syncope rule out arrhythmia, conduction abnormalities  CAD sp PCI ( outside records)  HTN  HLD  Emphysema    Plan:  - continue with tele-monitoring  - please check orthostatic VS  - check TSH, free T4  - obtain records of cardiac cath from [facility]  - follow up EP for further evaluation

## 2020-10-13 NOTE — ED ADULT NURSE REASSESSMENT NOTE - NS ED NURSE REASSESS COMMENT FT1
Pt alert and oriented x 4 FF VSS with no complaints awaiting [place] [facility] [place] no further interventions at this time will continue to monitor and assess.
Pt received from previous RN. Pt sleeping comfortably, no distress noted. VS obtained and stable. Remains on continuous cardiac monitoring. Awaiting testing. Will continue to observe.
pt assessed, pt tolerating meals and meds, pt ambulated to bathroom, pt waiting for EP consult.

## 2020-10-13 NOTE — PROGRESS NOTE ADULT - SUBJECTIVE AND OBJECTIVE BOX
SUBJECTIVE:  Patient is a 85y old Male who presents with a chief complaint of syncope (13 Oct 2020 11:06)  Currently admitted to medicine with the primary diagnosis of Syncope  Today is hospital day 1d.   This morning he is resting comfortably in bed and reports no new issues or overnight events.       PAST MEDICAL & SURGICAL HISTORY  Chronic GERD    Hyperlipidemia, group A    Depression    Coronary artery disease    Hypertension    Presence of stent in coronary artery  x3        ALLERGIES:  No Known Allergies    MEDICATIONS:  STANDING MEDICATIONS  amLODIPine   Tablet 5 milliGRAM(s) Oral daily  atorvastatin 10 milliGRAM(s) Oral at bedtime  chlorhexidine 4% Liquid 1 Application(s) Topical <User Schedule>  citalopram 20 milliGRAM(s) Oral daily  clopidogrel Tablet 75 milliGRAM(s) Oral daily  folic acid 1 milliGRAM(s) Oral daily  heparin   Injectable 5000 Unit(s) SubCutaneous every 8 hours  LORazepam     Tablet 0.5 milliGRAM(s) Oral at bedtime  metoprolol succinate ER 50 milliGRAM(s) Oral daily  omega-3-Acid Ethyl Esters 2 Gram(s) Oral two times a day  pantoprazole    Tablet 40 milliGRAM(s) Oral before breakfast  sodium chloride 0.9%. 1000 milliLiter(s) IV Continuous <Continuous>  tamsulosin 0.8 milliGRAM(s) Oral at bedtime  zolpidem 5 milliGRAM(s) Oral at bedtime    PRN MEDICATIONS  ALPRAZolam 0.5 milliGRAM(s) Oral three times a day PRN    VITALS:   T(F): 98.5  HR: 66  BP: 154/71  RR: 18  SpO2: 98%    LABS:                        13.1   8.21  )-----------( 198      ( 13 Oct 2020 11:20 )             38.7     10-13    138  |  107  |  22<H>  ----------------------------<  100<H>  3.9   |  22  |  1.3    Ca    8.4<L>      13 Oct 2020 11:20  Mg     2.2     10-11    TPro  6.2  /  Alb  4.1  /  TBili  0.6  /  DBili  x   /  AST  14  /  ALT  7   /  AlkPhos  69  10-11              CARDIAC MARKERS ( 12 Oct 2020 06:35 )  x     / <0.01 ng/mL / x     / x     / x      CARDIAC MARKERS ( 11 Oct 2020 18:41 )  x     / <0.01 ng/mL / x     / x     / x          RADIOLOGY:    PHYSICAL EXAM:  GEN: No acute distress  LUNGS: Clear to auscultation bilaterally   HEART: S1/S2 present. RRR.   ABD: Soft, non-tender, non-distended. Bowel sounds present  EXT: NC/NC/NE/2+PP/QUINTERO/Skin Intact.   NEURO: AAOX3    Intravenous access:   NG tube:   Meyers cathter:        SUBJECTIVE:  Patient is a 85y old Male who presents with a chief complaint of syncope (13 Oct 2020 11:06)  Currently admitted to medicine with the primary diagnosis of Syncope  Today is hospital day 1d.   This morning he is resting comfortably in bed and reports no new issues or overnight events.   Patient seen with granddaughter bedside, provided translation    PAST MEDICAL & SURGICAL HISTORY  Chronic GERD  Hyperlipidemia, group A  Depression  Coronary artery disease  Hypertension  Presence of stent in coronary artery x3    ALLERGIES:  No Known Allergies    MEDICATIONS:  STANDING MEDICATIONS  amLODIPine   Tablet 5 milliGRAM(s) Oral daily  atorvastatin 10 milliGRAM(s) Oral at bedtime  chlorhexidine 4% Liquid 1 Application(s) Topical <User Schedule>  citalopram 20 milliGRAM(s) Oral daily  clopidogrel Tablet 75 milliGRAM(s) Oral daily  folic acid 1 milliGRAM(s) Oral daily  heparin   Injectable 5000 Unit(s) SubCutaneous every 8 hours  LORazepam     Tablet 0.5 milliGRAM(s) Oral at bedtime  metoprolol succinate ER 50 milliGRAM(s) Oral daily  omega-3-Acid Ethyl Esters 2 Gram(s) Oral two times a day  pantoprazole    Tablet 40 milliGRAM(s) Oral before breakfast  sodium chloride 0.9%. 1000 milliLiter(s) IV Continuous <Continuous>  tamsulosin 0.8 milliGRAM(s) Oral at bedtime  zolpidem 5 milliGRAM(s) Oral at bedtime    PRN MEDICATIONS  ALPRAZolam 0.5 milliGRAM(s) Oral three times a day PRN    VITALS:   T(F): 98.5  HR: 66  BP: 154/71  RR: 18  SpO2: 98%    LABS:                        13.1   8.21  )-----------( 198      ( 13 Oct 2020 11:20 )             38.7     10-13    138  |  107  |  22<H>  ----------------------------<  100<H>  3.9   |  22  |  1.3    Ca    8.4<L>      13 Oct 2020 11:20  Mg     2.2     10-11    TPro  6.2  /  Alb  4.1  /  TBili  0.6  /  DBili  x   /  AST  14  /  ALT  7   /  AlkPhos  69  10-11    CARDIAC MARKERS ( 12 Oct 2020 06:35 )  x     / <0.01 ng/mL / x     / x     / x      CARDIAC MARKERS ( 11 Oct 2020 18:41 )  x     / <0.01 ng/mL / x     / x     / x        RADIOLOGY:  < from: CT Chest No Cont (10.11.20 @ 21:43) >  EXAM:  CT CHEST          PROCEDURE DATE:  10/11/2020      INTERPRETATION:  CLINICAL HISTORY/REASON FOR EXAM: Pneumothorax    TECHNIQUE: Contiguous CT images were obtained from the thoracic inlet to the upper abdomen.  Intravenous Contrast: None.    COMPARISON: None    FINDINGS:    LUNGS, PLEURA AND AIRWAYS: No pneumothorax. Moderate centrilobular emphysema. Bilateral calcified pleural plaques. Curvilinear opacity/consolidative change in the left lower lobe has the appearance favoring rounded atelectasis. No additional focal opacities or pleural effusion. No evidence for central obstructing endobronchial lesion. A few scattered granulomas. Left hemidiaphragm elevation    HEART AND VESSELS: Heart size is normal. Trace pericardial fluid without overt effusion. Normal caliber thoracic aorta. Coronary artery and aortic calcifications. The main pulmonary artery is dilated to 3.4 cm which can be seen in pulmonary hypertension    THORACIC INLET/MEDIASTINUM/LYMPH NODES: The visualized portion of the thyroid gland is unremarkable. There are no enlarged thoracic lymph nodes.    UPPER ABDOMEN: Partially imaged left renal cysts and calcifications.    BONES AND SOFT TISSUES: Degenerative changes of the spine.    IMPRESSION:    No pneumothorax.  Centrilobular emphysema.  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  Curvilinear opacity/consolidative change in the left lower lobe has the appearance favoring rounded atelectasis. Given no prior studies for comparison, 3 month follow-up is recommended to ensure stability.  Main pulmonary artery dilated to 3.4 cm which can be seen in pulmonary artery hypertension.  No additional evidence for focal opacity or acute pathology.    JOSE BOSWELL M.D., ATTENDING RADIOLOGIST  This document has been electronically signed. Oct 11 2020 10:19PM    < end of copied text >    PHYSICAL EXAM:  GEN: No acute distress  HEENT: atraumatic. normocephalic  LUNGS: Clear to auscultation bilaterally   HEART: S1/S2 present. RRR.   ABD: Soft, non-tender, non-distended. Bowel sounds present  EXT: no edmea  NEURO: AAOX3

## 2020-10-13 NOTE — CONSULT NOTE ADULT - ASSESSMENT
Assessment: 85 year old male, CAD s/p 3 stents, HTN, HDL, emphysema, asbestos exposure, GERD, depression, who presents s/p syncope 19:00 on 10/11. he had no associated symptoms aside for a short episode of dizziness a few hours prior; episode was fully witnessed by family who reported no seizure-like activity.    Impression:  Syncope  CAD sp PCI  HTN  HLD  Emphysema    Plan:  - Cont tele monitoring  - Recommend cardiology consult  - Please check orthostatic VS  - Check TSH, free T4  - Would recommend loop recorder to r/o arrhythmia prior to discharge  - Will follow Cardiologist: Dr. Olayinka Moyer at [facility]    Assessment: 85 year old male, CAD s/p 3 stents, HTN, HDL, emphysema, asbestos exposure, GERD, depression, who presents s/p syncope 19:00 on 10/11. he had no associated symptoms aside for a short episode of dizziness a few hours prior; episode was fully witnessed by family who reported no seizure-like activity.    Impression:  Syncope  CAD s/p PCI  HTN  HLD  Emphysema    Plan:  - Cont tele monitoring  - Recommend cardiology consult  - Obtain cath records  - Please check orthostatic BP and HR  - Consider neuro consult  - Check TSH, free T4  - Would recommend loop recorder to r/o arrhythmia prior to discharge. Risks/benefits/alternatives explained to patient and granddaughter.  - Will follow  - Pulm follow up

## 2020-10-13 NOTE — CONSULT NOTE ADULT - SUBJECTIVE AND OBJECTIVE BOX
Patient is a 85y old  Male who presents with a chief complaint of syncope (12 Oct 2020 14:14)    HPI: Patient is an 84 yo M with hx of CAD sp PCI (last stent 2018), HTN, HLD asbestos exposure with emphysema admitted for syncope. Granddaughter at the bedside who provides the history via translation and reports that patient was at her house for dinner on Sunday and was sitting at the table when he suddenly lost consciousness. She reports that he was out completely for about 1 minute and it took another 5 min approx to return to normal baseline. As per patient he remembers being served dinner and the next thing he remembers is being surrounded by EMS. When EMS came, pt was found to be hypotensive with SBP 70. He continued to have episodes of lightheadedness after this but no more episodes of LOC. Patient follows with cardiologist at [facility] in [place] (cannot remember the name) and last saw him around March. He had stent placed in 2018 and does not think he has had a stress test since that time. Denies episodes of dizziness, syncope, CP, palpitations prior to this episode. Pt feeling well now.    PAST MEDICAL & SURGICAL HISTORY:  Chronic GERD    Hyperlipidemia, group A    Depression    Coronary artery disease    Hypertension    Presence of stent in coronary artery  x3    PREVIOUS DIAGNOSTIC TESTING:      ECHO  FINDINGS:  < from: TTE Echo Complete w/o Contrast w/ Doppler (10.12.20 @ 10:27) >  Summary:   1. Left ventricular ejection fraction, by visual estimation, is 60 to 65%.   2. Normal global left ventricular systolic function.   3. Moderate concentric left ventricular hypertrophy.   4. Spectral Doppler shows impaired relaxation pattern of left ventricular myocardial filling (Grade I diastolic dysfunction).   5. Sclerotic aortic valve with normal opening.    STRESS  FINDINGS:    CATHETERIZATION  FINDINGS:    ELECTROPHYSIOLOGY STUDY  FINDINGS:    CAROTID ULTRASOUND:  FINDINGS    VENOUS DUPLEX SCAN:  FINDINGS:    CHEST CT PULMONARY ANGIO with IV Contrast:  FINDINGS:    MEDICATIONS  (STANDING):  amLODIPine   Tablet 5 milliGRAM(s) Oral daily  atorvastatin 10 milliGRAM(s) Oral at bedtime  chlorhexidine 4% Liquid 1 Application(s) Topical <User Schedule>  citalopram 20 milliGRAM(s) Oral daily  clopidogrel Tablet 75 milliGRAM(s) Oral daily  folic acid 1 milliGRAM(s) Oral daily  heparin   Injectable 5000 Unit(s) SubCutaneous every 8 hours  LORazepam     Tablet 0.5 milliGRAM(s) Oral at bedtime  metoprolol succinate ER 50 milliGRAM(s) Oral daily  omega-3-Acid Ethyl Esters 2 Gram(s) Oral two times a day  pantoprazole    Tablet 40 milliGRAM(s) Oral before breakfast  sodium chloride 0.9%. 1000 milliLiter(s) (50 mL/Hr) IV Continuous <Continuous>  tamsulosin 0.8 milliGRAM(s) Oral at bedtime  zolpidem 5 milliGRAM(s) Oral at bedtime    MEDICATIONS  (PRN):  ALPRAZolam 0.5 milliGRAM(s) Oral three times a day PRN anxiety      FAMILY HISTORY:  No pertinent family history in first degree relatives    SOCIAL HISTORY: Former smoker; No ETOH or illicit drug use    Past Surgical History: No significant hx    Allergies:  No Known Allergies      REVIEW OF SYSTEMS:  CONSTITUTIONAL: No fever, weight loss, chills, shakes, or fatigue  RESPIRATORY: No cough, wheezing, hemoptysis, or shortness of breath  CARDIOVASCULAR: No chest pain, dyspnea, palpitations,  paroxysmal nocturnal dyspnea, orthopnea, or arm or leg swelling  GASTROINTESTINAL: No abdominal  or epigastric pain, nausea, vomiting, hematemesis, diarrhea, constipation, melena or bright red blood.  NEUROLOGICAL: +Dizziness with syncope  MUSCULOSKELETAL: No joint pain or swelling, muscle, back, or extremity pain      Vital Signs Last 24 Hrs  T(C): 36.9 (13 Oct 2020 07:31), Max: 37.3 (12 Oct 2020 15:44)  T(F): 98.5 (13 Oct 2020 07:31), Max: 99.1 (12 Oct 2020 15:44)  HR: 66 (13 Oct 2020 07:31) (66 - 83)  BP: 154/71 (13 Oct 2020 07:31) (115/65 - 173/81)  BP(mean): --  RR: 18 (13 Oct 2020 07:31) (18 - 18)  SpO2: 98% (13 Oct 2020 07:31) (98% - 98%)    PHYSICAL EXAM:  GENERAL: In no apparent distress, well nourished, and hydrated.  HEAD:  Atraumatic, Normocephalic  EYES: EOMI, PERRLA, conjunctiva and sclera clear  ENMT: No tonsillar erythema, exudates, or enlargements; ist mucous membranes, Good dentition, No lesions  NECK: Supple and normal thyroid.  No JVD or carotid bruit.  Carotid pulse is 2+ bilaterally.  HEART: Regular rate and rhythm; No murmurs, rubs, or gallops.  PULMONARY: Clear to auscultation and perfusion.  No rales, wheezing, or rhonchi bilaterally.  ABDOMEN: Soft, Nontender, Nondistended; Bowel sounds present  EXTREMITIES:  2+ Peripheral Pulses, No clubbing, cyanosis, or edema  NEUROLOGICAL: Grossly nonfocal      INTERPRETATION OF TELEMETRY: NSR 72 bpm    ECG:  < from: 12 Lead ECG (10.12.20 @ 01:50) >  Ventricular Rate 73 BPM    Atrial Rate 73 BPM    P-R Interval 184 ms    QRS Duration 84 ms    Q-T Interval 382 ms    QTC Calculation(Bazett) 420 ms    P Axis 60 degrees    R Axis 7 degrees    T Axis 34 degrees    Diagnosis Line Normal sinus rhythm  Normal ECG    Confirmed by Lillian Boyce MD ([id number]) on 10/12/2020 10:18:23 AM    < end of copied text >      I&O's Detail    12 Oct 2020 07:01  -  13 Oct 2020 07:00  --------------------------------------------------------  IN:  Total IN: 0 mL    OUT:    Voided (mL): 350 mL  Total OUT: 350 mL    Total NET: -350 mL          LABS:                        14.2   8.41  )-----------( 213      ( 11 Oct 2020 18:41 )             42.5     10-12    139  |  107  |  27<H>  ----------------------------<  95  4.4   |  22  |  1.5    Ca    8.8      12 Oct 2020 06:35  Mg     2.2     10-11    TPro  6.2  /  Alb  4.1  /  TBili  0.6  /  DBili  x   /  AST  14  /  ALT  7   /  AlkPhos  69  10-11    CARDIAC MARKERS ( 12 Oct 2020 06:35 )  x     / <0.01 ng/mL / x     / x     / x      CARDIAC MARKERS ( 11 Oct 2020 18:41 )  x     / <0.01 ng/mL / x     / x     / x              BNP  I&O's Detail    12 Oct 2020 07:01  -  13 Oct 2020 07:00  --------------------------------------------------------  IN:  Total IN: 0 mL    OUT:    Voided (mL): 350 mL  Total OUT: 350 mL    Total NET: -350 mL        Daily     Daily     RADIOLOGY & ADDITIONAL STUDIES:    < from: CT Chest No Cont (10.11.20 @ 21:43) >    IMPRESSION:    No pneumothorax.  Centrilobular emphysema.  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  Curvilinear opacity/consolidative change in the left lower lobe has the appearance favoring rounded atelectasis. Given no prior studies for comparison, 3 month follow-up is recommended to ensure stability.  Main pulmonary artery dilated to 3.4 cm which can be seen in pulmonary artery hypertension.  No additional evidence for focal opacity or acute pathology.    < end of copied text >   Patient is a 85y old  Male who presents with a chief complaint of syncope (12 Oct 2020 14:14)    HPI: Patient is an 86 yo Nigerien speaking M with hx of CAD sp PCI (last stent 2018), HTN, HLD asbestos exposure with emphysema admitted for syncope. Granddaughter at the bedside who provides the history via translation and reports that patient was at her house for dinner on Sunday and was sitting at the table when he suddenly lost consciousness. She reports that he was out completely for about 1 minute and it took another 5 min approx to return to normal baseline. As per patient he remembers being served dinner and the next thing he remembers is being surrounded by EMS. When EMS came, pt was found to be hypotensive with SBP 70. He continued to have episodes of lightheadedness after this but no more episodes of LOC. Patient follows with cardiologist at [facility] in [place] (cannot remember the name) and last saw him around March. He had stent placed in 2018 and does not think he has had a stress test since that time. Denies episodes of dizziness, syncope, CP, palpitations prior to this episode. Pt feeling well now.    PAST MEDICAL & SURGICAL HISTORY:  Chronic GERD    Hyperlipidemia, group A    Depression    Coronary artery disease    Hypertension    Presence of stent in coronary artery  x3    PREVIOUS DIAGNOSTIC TESTING:      ECHO  FINDINGS:  < from: TTE Echo Complete w/o Contrast w/ Doppler (10.12.20 @ 10:27) >  Summary:   1. Left ventricular ejection fraction, by visual estimation, is 60 to 65%.   2. Normal global left ventricular systolic function.   3. Moderate concentric left ventricular hypertrophy.   4. Spectral Doppler shows impaired relaxation pattern of left ventricular myocardial filling (Grade I diastolic dysfunction).   5. Sclerotic aortic valve with normal opening.      MEDICATIONS  (STANDING):  amLODIPine   Tablet 5 milliGRAM(s) Oral daily  atorvastatin 10 milliGRAM(s) Oral at bedtime  chlorhexidine 4% Liquid 1 Application(s) Topical <User Schedule>  citalopram 20 milliGRAM(s) Oral daily  clopidogrel Tablet 75 milliGRAM(s) Oral daily  folic acid 1 milliGRAM(s) Oral daily  heparin   Injectable 5000 Unit(s) SubCutaneous every 8 hours  LORazepam     Tablet 0.5 milliGRAM(s) Oral at bedtime  metoprolol succinate ER 50 milliGRAM(s) Oral daily  omega-3-Acid Ethyl Esters 2 Gram(s) Oral two times a day  pantoprazole    Tablet 40 milliGRAM(s) Oral before breakfast  sodium chloride 0.9%. 1000 milliLiter(s) (50 mL/Hr) IV Continuous <Continuous>  tamsulosin 0.8 milliGRAM(s) Oral at bedtime  zolpidem 5 milliGRAM(s) Oral at bedtime    MEDICATIONS  (PRN):  ALPRAZolam 0.5 milliGRAM(s) Oral three times a day PRN anxiety      FAMILY HISTORY:  No pertinent family history in first degree relatives    SOCIAL HISTORY: Former smoker; No ETOH or illicit drug use    Past Surgical History: No significant hx    Allergies:  No Known Allergies      REVIEW OF SYSTEMS:  CONSTITUTIONAL: No fever, weight loss, chills, shakes, or fatigue  RESPIRATORY: No cough, wheezing, hemoptysis, or shortness of breath  CARDIOVASCULAR: No chest pain, dyspnea, palpitations,  paroxysmal nocturnal dyspnea, orthopnea, or arm or leg swelling  GASTROINTESTINAL: No abdominal  or epigastric pain, nausea, vomiting, hematemesis, diarrhea, constipation, melena or bright red blood.  NEUROLOGICAL: +Dizziness with syncope  MUSCULOSKELETAL: No joint pain or swelling, muscle, back, or extremity pain      Vital Signs Last 24 Hrs  T(C): 36.9 (13 Oct 2020 07:31), Max: 37.3 (12 Oct 2020 15:44)  T(F): 98.5 (13 Oct 2020 07:31), Max: 99.1 (12 Oct 2020 15:44)  HR: 66 (13 Oct 2020 07:31) (66 - 83)  BP: 154/71 (13 Oct 2020 07:31) (115/65 - 173/81)  BP(mean): --  RR: 18 (13 Oct 2020 07:31) (18 - 18)  SpO2: 98% (13 Oct 2020 07:31) (98% - 98%)    PHYSICAL EXAM:  GENERAL: In no apparent distress, well nourished, and hydrated.  HEAD:  Atraumatic, Normocephalic  EYES: EOMI, PERRLA, conjunctiva and sclera clear  ENMT: MMM  NECK: Supple    HEART: Regular rate and rhythm; No murmurs, rubs, or gallops.  PULMONARY: Clear to auscultation and perfusion.  No rales, wheezing, or rhonchi bilaterally.  ABDOMEN: Soft, Nontender, Nondistended; Bowel sounds present  EXTREMITIES:  2+ Peripheral Pulses, No clubbing, cyanosis, or edema  NEUROLOGICAL: Grossly nonfocal      INTERPRETATION OF TELEMETRY: NSR 72 bpm    ECG:  < from: 12 Lead ECG (10.12.20 @ 01:50) >  Ventricular Rate 73 BPM    Atrial Rate 73 BPM    P-R Interval 184 ms    QRS Duration 84 ms    Q-T Interval 382 ms    QTC Calculation(Bazett) 420 ms    P Axis 60 degrees    R Axis 7 degrees    T Axis 34 degrees    Diagnosis Line Normal sinus rhythm  Normal ECG    Confirmed by Lillian Boyce MD ([id number]) on 10/12/2020 10:18:23 AM    < end of copied text >      I&O's Detail    12 Oct 2020 07:01  -  13 Oct 2020 07:00  --------------------------------------------------------  IN:  Total IN: 0 mL    OUT:    Voided (mL): 350 mL  Total OUT: 350 mL    Total NET: -350 mL          LABS:                        14.2   8.41  )-----------( 213      ( 11 Oct 2020 18:41 )             42.5     10-12    139  |  107  |  27<H>  ----------------------------<  95  4.4   |  22  |  1.5    Ca    8.8      12 Oct 2020 06:35  Mg     2.2     10-11    TPro  6.2  /  Alb  4.1  /  TBili  0.6  /  DBili  x   /  AST  14  /  ALT  7   /  AlkPhos  69  10-11    CARDIAC MARKERS ( 12 Oct 2020 06:35 )  x     / <0.01 ng/mL / x     / x     / x      CARDIAC MARKERS ( 11 Oct 2020 18:41 )  x     / <0.01 ng/mL / x     / x     / x              BNP  I&O's Detail    12 Oct 2020 07:01  -  13 Oct 2020 07:00  --------------------------------------------------------  IN:  Total IN: 0 mL    OUT:    Voided (mL): 350 mL  Total OUT: 350 mL    Total NET: -350 mL        Daily     Daily     RADIOLOGY & ADDITIONAL STUDIES:    < from: CT Chest No Cont (10.11.20 @ 21:43) >  IMPRESSION:  No pneumothorax.  Centrilobular emphysema.  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  Curvilinear opacity/consolidative change in the left lower lobe has the appearance favoring rounded atelectasis. Given no prior studies for comparison, 3 month follow-up is recommended to ensure stability.  Main pulmonary artery dilated to 3.4 cm which can be seen in pulmonary artery hypertension.  No additional evidence for focal opacity or acute pathology.  < end of copied text >

## 2020-10-13 NOTE — CONSULT NOTE ADULT - SUBJECTIVE AND OBJECTIVE BOX
HPI:  85 year old male, CAD s/p 3 stents, HTN, HDL, emphysema, asbestos exposure, GERD, depression, who presents s/p syncope 19:00 on 10/11. Per patient he was sitting at dinner table and lost consciousness for 1 minute. He had no preceding symptoms at all, and when he woke up he felt "weak" with no other symptoms. He states the EMTs said his "blood pressure was low". He recalls sitting at table and then waking up surrounded by people. His family witnessed the whole episode; they state he suddenly did not answer them and slumped in his chair. They report no seizure activity, posttictal state, no urinary/bowel incontinence.   Of note, he reports another episode of dizziness that he felt yesterday afternoon that resolved spontaneously after 2 minutes.   He denies recent illness, CP, SOB, abd pain, nausea, vomiting, diarrhea at any time.    He has had 3 stents; the latest was mid 2018. He sees a cardiologist in [facility] who he does not remember the name; the last time he saw him was in February or March.    ED course: Vitals remained WNL. EKGs showed NSR. CT showed emphysema and asbestos plaques. He received 2 L NS and is admitted for syncopal w/u. (12 Oct 2020 14:14)      PAST MEDICAL & SURGICAL HISTORY  Chronic GERD    Hyperlipidemia, group A    Depression    Coronary artery disease    Hypertension    Presence of stent in coronary artery  x3        FAMILY HISTORY:  FAMILY HISTORY:  No pertinent family history in first degree relatives        SOCIAL HISTORY:  []smoker  []Alcohol  []Drug    ALLERGIES:  No Known Allergies      MEDICATIONS:  MEDICATIONS  (STANDING):  amLODIPine   Tablet 5 milliGRAM(s) Oral daily  atorvastatin 10 milliGRAM(s) Oral at bedtime  chlorhexidine 4% Liquid 1 Application(s) Topical <User Schedule>  citalopram 20 milliGRAM(s) Oral daily  clopidogrel Tablet 75 milliGRAM(s) Oral daily  folic acid 1 milliGRAM(s) Oral daily  heparin   Injectable 5000 Unit(s) SubCutaneous every 8 hours  LORazepam     Tablet 0.5 milliGRAM(s) Oral at bedtime  metoprolol succinate ER 50 milliGRAM(s) Oral daily  omega-3-Acid Ethyl Esters 2 Gram(s) Oral two times a day  pantoprazole    Tablet 40 milliGRAM(s) Oral before breakfast  sodium chloride 0.9%. 1000 milliLiter(s) (50 mL/Hr) IV Continuous <Continuous>  tamsulosin 0.8 milliGRAM(s) Oral at bedtime  zolpidem 5 milliGRAM(s) Oral at bedtime    MEDICATIONS  (PRN):  ALPRAZolam 0.5 milliGRAM(s) Oral three times a day PRN anxiety      HOME MEDICATIONS:  Home Medications:  ALPRAZolam 0.5 mg oral tablet: 1 tab(s) orally 3 times a day, As Needed (12 Oct 2020 14:32)  amLODIPine 5 mg oral tablet: 1 tab(s) orally once a day (12 Oct 2020 14:29)  atorvastatin 10 mg oral tablet: 1 tab(s) orally 3 to 4 times a week (12 Oct 2020 14:31)  citalopram 20 mg oral tablet: 1 tab(s) orally once a day (12 Oct 2020 14:32)  Flomax: 0.8 milligram(s) orally once a day (12 Oct 2020 14:30)  folic acid 0.8 mg oral tablet: 1 tab(s) orally once a day (12 Oct 2020 14:32)  LORazepam 0.5 mg oral tablet: 1 tab(s) orally once a day (at bedtime) (12 Oct 2020 14:31)  losartan 50 mg oral tablet: 1 tab(s) orally once a day (12 Oct 2020 14:34)  metoprolol succinate 50 mg oral tablet, extended release: 1 tab(s) orally once a day (12 Oct 2020 14:29)  Omega-3 350 mg oral capsule: 1 cap(s) orally once a day (12 Oct 2020 14:30)  pantoprazole 40 mg oral delayed release tablet: 1 tab(s) orally once a day (12 Oct 2020 14:31)  Plavix 75 mg oral tablet: 1 tab(s) orally once a day (12 Oct 2020 14:29)  zolpidem 5 mg oral tablet: 1 tab(s) orally once a day (at bedtime) (12 Oct 2020 14:31)      VITALS:   T(F): 98.5 (10-13 @ 07:31), Max: 99.1 (10-12 @ 15:44)  HR: 66 (10-13 @ 07:31) (66 - 83)  BP: 154/71 (10-13 @ 07:31) (115/65 - 173/81)  BP(mean): --  RR: 18 (10-13 @ 07:31) (16 - 18)  SpO2: 98% (10-13 @ 07:31) (97% - 98%)    I&O's Summary    12 Oct 2020 07:01  -  13 Oct 2020 07:00  --------------------------------------------------------  IN: 0 mL / OUT: 350 mL / NET: -350 mL        REVIEW OF SYSTEMS:  CONSTITUTIONAL: No weakness, fevers or chills  EYES/ENT: No visual changes;  No vertigo or throat pain   NECK: No pain or stiffness  RESPIRATORY: No cough, wheezing, hemoptysis; No shortness of breath  CARDIOVASCULAR: No chest pain or palpitations  GASTROINTESTINAL: No abdominal or epigastric pain. No nausea, vomiting, or hematemesis; No diarrhea or constipation. No melena or hematochezia.  GENITOURINARY: No dysuria, frequency or hematuria  NEUROLOGICAL: No numbness or weakness  SKIN: No itching, no rashes    PHYSICAL EXAM:  NEURO: patient is awake , alert and oriented  GEN: Not in acute distress  NECK: no thyroid enlargement, no JVD  LUNGS: Clear to auscultation bilaterally   CARDIOVASCULAR: S1/S2 present, RRR , no murmus or rubs, + PP bilaterally  ABD: Soft, non-tender, non-distended, +BS  EXT: No GIOVANNY  SKIN: Intact    LABS:                        13.1   8.21  )-----------( 198      ( 13 Oct 2020 11:20 )             38.7     10-13    138  |  107  |  22<H>  ----------------------------<  100<H>  3.9   |  22  |  1.3    Ca    8.4<L>      13 Oct 2020 11:20  Mg     2.2     10-11    TPro  6.2  /  Alb  4.1  /  TBili  0.6  /  DBili  x   /  AST  14  /  ALT  7   /  AlkPhos  69  10-11        CARDIAC MARKERS ( 12 Oct 2020 06:35 )  x     / <0.01 ng/mL / x     / x     / x      CARDIAC MARKERS ( 11 Oct 2020 18:41 )  x     / <0.01 ng/mL / x     / x     / x            Troponin trend:    Serum Pro-Brain Natriuretic Peptide: 271 pg/mL (10-11-20 @ 18:41)    10-12 Chol 139 LDL 94 HDL 39<L> Trig 56          RADIOLOGY:  -CXR:< from: CT Chest No Cont (10.11.20 @ 21:43) >  No pneumothorax.  Centrilobular emphysema.  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  Curvilinear opacity/consolidative change in the left lower lobe has the appearance favoring rounded atelectasis. Given no prior studies for comparison, 3 month follow-up is recommended to ensure stability.  Main pulmonary artery dilated to 3.4 cm which can be seen in pulmonary artery hypertension.  No additional evidence for focal opacity or acute pathology.    < end of copied text >    -TTE:  < from: TTE Echo Complete w/o Contrast w/ Doppler (10.12.20 @ 10:27) >    Summary:   1. Left ventricular ejection fraction, by visual estimation, is 60 to 65%.   2. Normal global left ventricular systolic function.   3. Moderate concentric left ventricular hypertrophy.   4. Spectral Doppler shows impaired relaxation pattern of left ventricular myocardial filling (Grade I diastolic dysfunction).   5. Sclerotic aortic valve with normal opening.    < end of copied text >        ECG:  < from: 12 Lead ECG (10.12.20 @ 01:50) >  Diagnosis Line Normal sinus rhythm  Normal ECG    < end of copied text >    TELEMETRY EVENTS:

## 2020-10-14 LAB
ALBUMIN SERPL ELPH-MCNC: 3.5 G/DL — SIGNIFICANT CHANGE UP (ref 3.5–5.2)
ALP SERPL-CCNC: 64 U/L — SIGNIFICANT CHANGE UP (ref 30–115)
ALT FLD-CCNC: 6 U/L — SIGNIFICANT CHANGE UP (ref 0–41)
ANION GAP SERPL CALC-SCNC: 6 MMOL/L — LOW (ref 7–14)
AST SERPL-CCNC: 13 U/L — SIGNIFICANT CHANGE UP (ref 0–41)
BASOPHILS # BLD AUTO: 0.05 K/UL — SIGNIFICANT CHANGE UP (ref 0–0.2)
BASOPHILS NFR BLD AUTO: 0.6 % — SIGNIFICANT CHANGE UP (ref 0–1)
BILIRUB SERPL-MCNC: 0.6 MG/DL — SIGNIFICANT CHANGE UP (ref 0.2–1.2)
BUN SERPL-MCNC: 28 MG/DL — HIGH (ref 10–20)
CALCIUM SERPL-MCNC: 8.7 MG/DL — SIGNIFICANT CHANGE UP (ref 8.5–10.1)
CHLORIDE SERPL-SCNC: 104 MMOL/L — SIGNIFICANT CHANGE UP (ref 98–110)
CO2 SERPL-SCNC: 25 MMOL/L — SIGNIFICANT CHANGE UP (ref 17–32)
CREAT SERPL-MCNC: 1.5 MG/DL — SIGNIFICANT CHANGE UP (ref 0.7–1.5)
EOSINOPHIL # BLD AUTO: 0.14 K/UL — SIGNIFICANT CHANGE UP (ref 0–0.7)
EOSINOPHIL NFR BLD AUTO: 1.7 % — SIGNIFICANT CHANGE UP (ref 0–8)
GLUCOSE SERPL-MCNC: 95 MG/DL — SIGNIFICANT CHANGE UP (ref 70–99)
HCT VFR BLD CALC: 39.1 % — LOW (ref 42–52)
HGB BLD-MCNC: 13 G/DL — LOW (ref 14–18)
IMM GRANULOCYTES NFR BLD AUTO: 0.6 % — HIGH (ref 0.1–0.3)
LYMPHOCYTES # BLD AUTO: 2.32 K/UL — SIGNIFICANT CHANGE UP (ref 1.2–3.4)
LYMPHOCYTES # BLD AUTO: 28.1 % — SIGNIFICANT CHANGE UP (ref 20.5–51.1)
MCHC RBC-ENTMCNC: 29.5 PG — SIGNIFICANT CHANGE UP (ref 27–31)
MCHC RBC-ENTMCNC: 33.2 G/DL — SIGNIFICANT CHANGE UP (ref 32–37)
MCV RBC AUTO: 88.7 FL — SIGNIFICANT CHANGE UP (ref 80–94)
MONOCYTES # BLD AUTO: 0.77 K/UL — HIGH (ref 0.1–0.6)
MONOCYTES NFR BLD AUTO: 9.3 % — SIGNIFICANT CHANGE UP (ref 1.7–9.3)
NEUTROPHILS # BLD AUTO: 4.92 K/UL — SIGNIFICANT CHANGE UP (ref 1.4–6.5)
NEUTROPHILS NFR BLD AUTO: 59.7 % — SIGNIFICANT CHANGE UP (ref 42.2–75.2)
NRBC # BLD: 0 /100 WBCS — SIGNIFICANT CHANGE UP (ref 0–0)
PLATELET # BLD AUTO: 197 K/UL — SIGNIFICANT CHANGE UP (ref 130–400)
POTASSIUM SERPL-MCNC: 5 MMOL/L — SIGNIFICANT CHANGE UP (ref 3.5–5)
POTASSIUM SERPL-SCNC: 5 MMOL/L — SIGNIFICANT CHANGE UP (ref 3.5–5)
PROT SERPL-MCNC: 5.3 G/DL — LOW (ref 6–8)
RBC # BLD: 4.41 M/UL — LOW (ref 4.7–6.1)
RBC # FLD: 13.7 % — SIGNIFICANT CHANGE UP (ref 11.5–14.5)
SODIUM SERPL-SCNC: 135 MMOL/L — SIGNIFICANT CHANGE UP (ref 135–146)
WBC # BLD: 8.25 K/UL — SIGNIFICANT CHANGE UP (ref 4.8–10.8)
WBC # FLD AUTO: 8.25 K/UL — SIGNIFICANT CHANGE UP (ref 4.8–10.8)

## 2020-10-14 PROCEDURE — 99233 SBSQ HOSP IP/OBS HIGH 50: CPT

## 2020-10-14 RX ORDER — SODIUM CHLORIDE 9 MG/ML
500 INJECTION INTRAMUSCULAR; INTRAVENOUS; SUBCUTANEOUS ONCE
Refills: 0 | Status: COMPLETED | OUTPATIENT
Start: 2020-10-14 | End: 2020-10-14

## 2020-10-14 RX ORDER — INFLUENZA VIRUS VACCINE 15; 15; 15; 15 UG/.5ML; UG/.5ML; UG/.5ML; UG/.5ML
0.5 SUSPENSION INTRAMUSCULAR ONCE
Refills: 0 | Status: COMPLETED | OUTPATIENT
Start: 2020-10-14 | End: 2020-10-14

## 2020-10-14 RX ORDER — TAMSULOSIN HYDROCHLORIDE 0.4 MG/1
0.4 CAPSULE ORAL ONCE
Refills: 0 | Status: COMPLETED | OUTPATIENT
Start: 2020-10-14 | End: 2020-10-14

## 2020-10-14 RX ORDER — TAMSULOSIN HYDROCHLORIDE 0.4 MG/1
0.4 CAPSULE ORAL AT BEDTIME
Refills: 0 | Status: DISCONTINUED | OUTPATIENT
Start: 2020-10-14 | End: 2020-10-14

## 2020-10-14 RX ORDER — LANOLIN ALCOHOL/MO/W.PET/CERES
5 CREAM (GRAM) TOPICAL AT BEDTIME
Refills: 0 | Status: DISCONTINUED | OUTPATIENT
Start: 2020-10-14 | End: 2020-10-16

## 2020-10-14 RX ORDER — SODIUM CHLORIDE 9 MG/ML
500 INJECTION INTRAMUSCULAR; INTRAVENOUS; SUBCUTANEOUS ONCE
Refills: 0 | Status: DISCONTINUED | OUTPATIENT
Start: 2020-10-14 | End: 2020-10-15

## 2020-10-14 RX ORDER — ALPRAZOLAM 0.25 MG
0.5 TABLET ORAL AT BEDTIME
Refills: 0 | Status: DISCONTINUED | OUTPATIENT
Start: 2020-10-14 | End: 2020-10-15

## 2020-10-14 RX ORDER — TAMSULOSIN HYDROCHLORIDE 0.4 MG/1
0.8 CAPSULE ORAL AT BEDTIME
Refills: 0 | Status: DISCONTINUED | OUTPATIENT
Start: 2020-10-14 | End: 2020-10-16

## 2020-10-14 RX ORDER — LANOLIN ALCOHOL/MO/W.PET/CERES
1 CREAM (GRAM) TOPICAL AT BEDTIME
Refills: 0 | Status: DISCONTINUED | OUTPATIENT
Start: 2020-10-14 | End: 2020-10-16

## 2020-10-14 RX ORDER — ZOLPIDEM TARTRATE 10 MG/1
5 TABLET ORAL AT BEDTIME
Refills: 0 | Status: DISCONTINUED | OUTPATIENT
Start: 2020-10-14 | End: 2020-10-15

## 2020-10-14 RX ADMIN — AMLODIPINE BESYLATE 5 MILLIGRAM(S): 2.5 TABLET ORAL at 06:20

## 2020-10-14 RX ADMIN — HEPARIN SODIUM 5000 UNIT(S): 5000 INJECTION INTRAVENOUS; SUBCUTANEOUS at 06:20

## 2020-10-14 RX ADMIN — ATORVASTATIN CALCIUM 10 MILLIGRAM(S): 80 TABLET, FILM COATED ORAL at 22:23

## 2020-10-14 RX ADMIN — HEPARIN SODIUM 5000 UNIT(S): 5000 INJECTION INTRAVENOUS; SUBCUTANEOUS at 22:23

## 2020-10-14 RX ADMIN — Medication 50 MILLIGRAM(S): at 06:20

## 2020-10-14 RX ADMIN — PANTOPRAZOLE SODIUM 40 MILLIGRAM(S): 20 TABLET, DELAYED RELEASE ORAL at 06:20

## 2020-10-14 RX ADMIN — TAMSULOSIN HYDROCHLORIDE 0.4 MILLIGRAM(S): 0.4 CAPSULE ORAL at 23:08

## 2020-10-14 RX ADMIN — SODIUM CHLORIDE 50 MILLILITER(S): 9 INJECTION INTRAMUSCULAR; INTRAVENOUS; SUBCUTANEOUS at 10:40

## 2020-10-14 RX ADMIN — TAMSULOSIN HYDROCHLORIDE 0.4 MILLIGRAM(S): 0.4 CAPSULE ORAL at 22:23

## 2020-10-14 RX ADMIN — CLOPIDOGREL BISULFATE 75 MILLIGRAM(S): 75 TABLET, FILM COATED ORAL at 12:35

## 2020-10-14 RX ADMIN — SODIUM CHLORIDE 500 MILLILITER(S): 9 INJECTION INTRAMUSCULAR; INTRAVENOUS; SUBCUTANEOUS at 10:41

## 2020-10-14 RX ADMIN — Medication 1 MILLIGRAM(S): at 12:35

## 2020-10-14 RX ADMIN — ZOLPIDEM TARTRATE 5 MILLIGRAM(S): 10 TABLET ORAL at 22:22

## 2020-10-14 RX ADMIN — Medication 2 GRAM(S): at 17:44

## 2020-10-14 RX ADMIN — Medication 2 GRAM(S): at 06:21

## 2020-10-14 RX ADMIN — CITALOPRAM 20 MILLIGRAM(S): 10 TABLET, FILM COATED ORAL at 12:35

## 2020-10-14 RX ADMIN — Medication 0.5 MILLIGRAM(S): at 22:22

## 2020-10-14 NOTE — PROGRESS NOTE ADULT - SUBJECTIVE AND OBJECTIVE BOX
SUBJECTIVE:    Patient is a 85y old Male who presents with a chief complaint of syncope (14 Oct 2020 12:39)    Currently admitted to medicine with the primary diagnosis of Syncope       Today is hospital day 2d. This morning he is resting comfortably in bed and reports no new issues or overnight events. No fevers, chills. Denies CP or SOB. No N/V/D. Has been eating well. No dysuria. Last BM was. Adequate sleep. Ambulating.      PAST MEDICAL & SURGICAL HISTORY  Chronic GERD    Hyperlipidemia, group A    Depression    Coronary artery disease    Hypertension    Presence of stent in coronary artery  x3      SOCIAL HISTORY:  Negative for smoking/alcohol/drug use.     ALLERGIES:  No Known Allergies    MEDICATIONS:  STANDING MEDICATIONS  atorvastatin 10 milliGRAM(s) Oral at bedtime  chlorhexidine 4% Liquid 1 Application(s) Topical <User Schedule>  citalopram 20 milliGRAM(s) Oral daily  clopidogrel Tablet 75 milliGRAM(s) Oral daily  heparin   Injectable 5000 Unit(s) SubCutaneous every 8 hours  influenza   Vaccine 0.5 milliLiter(s) IntraMuscular once  melatonin 1 milliGRAM(s) Oral at bedtime  metoprolol succinate ER 50 milliGRAM(s) Oral daily  omega-3-Acid Ethyl Esters 2 Gram(s) Oral two times a day  sodium chloride 0.9%. 1000 milliLiter(s) IV Continuous <Continuous>  tamsulosin 0.4 milliGRAM(s) Oral at bedtime    PRN MEDICATIONS  ALPRAZolam 0.5 milliGRAM(s) Oral at bedtime PRN    VITALS:   T(F): 98.9  HR: 64  BP: 138/65  RR: 20  SpO2: 98%      PHYSICAL EXAM:  GEN: NAD, lying in bed comfortably  LUNGS: Clear to auscultation bilaterally  HEART: S1/S2 present   ABD: Soft, non-tender, non-distended. Bowel sounds present.  EXT: No edema.   NEURO: AAOX3. non focal    LABS:                        13.0   8.25  )-----------( 197      ( 14 Oct 2020 05:12 )             39.1     10-14    135  |  104  |  28<H>  ----------------------------<  95  5.0   |  25  |  1.5    Ca    8.7      14 Oct 2020 05:12    TPro  5.3<L>  /  Alb  3.5  /  TBili  0.6  /  DBili  x   /  AST  13  /  ALT  6   /  AlkPhos  64  10-14

## 2020-10-14 NOTE — PROGRESS NOTE ADULT - SUBJECTIVE AND OBJECTIVE BOX
pt seen and examined.   no new complaints, feels ok, gets dizzy when he gets up, has significant orthostatic changes   BP laying down is 160 /68 , sitting 131/53 and standing is 101/52  HR 68, 71 and 77     T(C): 37.2 (14 Oct 2020 11:14), Max: 37.2 (14 Oct 2020 11:14)  T(F): 98.9 (14 Oct 2020 11:14), Max: 98.9 (14 Oct 2020 11:14)  RR: 20 (14 Oct 2020 11:14) (18 - 20)  SpO2: 98% (13 Oct 2020 22:00) (97% - 98%)    Physical exam:   constitutional NAD, AAOX3, Respiratory  lungs CTA, CVS heart RRR, GI: abdomen Soft NT, ND, BS+, skin: intact  neuro exam non focal.                           13.0   8.25  )-----------( 197      ( 14 Oct 2020 05:12 )             39.1   10-14    135  |  104  |  28<H>  ----------------------------<  95  5.0   |  25  |  1.5    Ca    8.7      14 Oct 2020 05:12    TPro  5.3<L>  /  Alb  3.5  /  TBili  0.6  /  DBili  x   /  AST  13  /  ALT  6   /  AlkPhos  64  10-14    a/p  #dizzyness and syncope, post prandial , orthostatic hypotension, and questionable post prandial hypoglycemia ( post prandial Finger stick at home after dinner was 90 as per granddaugther)   vanesa stockings, IVF,   recheck bp after IVF     # abnormal cr, nephro referral is needed. probably ckd, will need records from pmd, get kidney and bladder us.     # cad, resident is going to obtain his records from his pmd.     full code    dw pt and his granddaughter Louisa , she is upset that the orthostatic changes are not fixed yet.     dw EP and cardio.    pt seen and examined.   no new complaints, feels ok, gets dizzy when he gets up, has significant orthostatic changes   BP laying down is 160 /68 , sitting 131/53 and standing is 101/52  HR 68, 71 and 77     T(C): 37.2 (14 Oct 2020 11:14), Max: 37.2 (14 Oct 2020 11:14)  T(F): 98.9 (14 Oct 2020 11:14), Max: 98.9 (14 Oct 2020 11:14)  RR: 20 (14 Oct 2020 11:14) (18 - 20)  SpO2: 98% (13 Oct 2020 22:00) (97% - 98%)    Physical exam:   constitutional NAD, AAOX3, Respiratory  lungs CTA, CVS heart RRR, GI: abdomen Soft NT, ND, BS+, skin: intact  neuro exam non focal.                           13.0   8.25  )-----------( 197      ( 14 Oct 2020 05:12 )             39.1   10-14    135  |  104  |  28<H>  ----------------------------<  95  5.0   |  25  |  1.5    Ca    8.7      14 Oct 2020 05:12    TPro  5.3<L>  /  Alb  3.5  /  TBili  0.6  /  DBili  x   /  AST  13  /  ALT  6   /  AlkPhos  64  10-14    a/p  #dizzyness and syncope, post prandial , orthostatic hypotension, and questionable post prandial hypoglycemia ( post prandial Finger stick at home after dinner was 90 as per granddaugther)     syncope less likely due to benzodiazepines ( xanax tid) , ambian, flomax, and metoprolol , all can cause dizziness, [place] in elderly pt. dc ambian, decrease dose of flomax, and may need to stop it. may need to decrease dose of metoprolol as well as xanax. ( need to speak with his pmd and his cardiologist )     vanesa riojas, IVF,   recheck bp after IVF     # abnormal cr, nephro referral is needed. probably ckd, will need records from pmd, get kidney and bladder us.     # cad, resident is going to obtain his records from his pmd.     full code    dw pt and his granddaughter Louisa , she is upset that the orthostatic changes are not fixed yet.     dw EP and cardio.

## 2020-10-15 LAB
ALBUMIN SERPL ELPH-MCNC: 3.6 G/DL — SIGNIFICANT CHANGE UP (ref 3.5–5.2)
ALP SERPL-CCNC: 71 U/L — SIGNIFICANT CHANGE UP (ref 30–115)
ALT FLD-CCNC: 6 U/L — SIGNIFICANT CHANGE UP (ref 0–41)
ANION GAP SERPL CALC-SCNC: 7 MMOL/L — SIGNIFICANT CHANGE UP (ref 7–14)
AST SERPL-CCNC: 13 U/L — SIGNIFICANT CHANGE UP (ref 0–41)
BILIRUB SERPL-MCNC: 0.5 MG/DL — SIGNIFICANT CHANGE UP (ref 0.2–1.2)
BUN SERPL-MCNC: 29 MG/DL — HIGH (ref 10–20)
CALCIUM SERPL-MCNC: 9 MG/DL — SIGNIFICANT CHANGE UP (ref 8.5–10.1)
CHLORIDE SERPL-SCNC: 107 MMOL/L — SIGNIFICANT CHANGE UP (ref 98–110)
CO2 SERPL-SCNC: 25 MMOL/L — SIGNIFICANT CHANGE UP (ref 17–32)
CREAT SERPL-MCNC: 1.7 MG/DL — HIGH (ref 0.7–1.5)
GLUCOSE BLDC GLUCOMTR-MCNC: 107 MG/DL — HIGH (ref 70–99)
GLUCOSE SERPL-MCNC: 99 MG/DL — SIGNIFICANT CHANGE UP (ref 70–99)
HCT VFR BLD CALC: 39.4 % — LOW (ref 42–52)
HGB BLD-MCNC: 13.3 G/DL — LOW (ref 14–18)
MAGNESIUM SERPL-MCNC: 2.1 MG/DL — SIGNIFICANT CHANGE UP (ref 1.8–2.4)
MCHC RBC-ENTMCNC: 30.4 PG — SIGNIFICANT CHANGE UP (ref 27–31)
MCHC RBC-ENTMCNC: 33.8 G/DL — SIGNIFICANT CHANGE UP (ref 32–37)
MCV RBC AUTO: 90.2 FL — SIGNIFICANT CHANGE UP (ref 80–94)
NRBC # BLD: 0 /100 WBCS — SIGNIFICANT CHANGE UP (ref 0–0)
PLATELET # BLD AUTO: 190 K/UL — SIGNIFICANT CHANGE UP (ref 130–400)
POTASSIUM SERPL-MCNC: 5.2 MMOL/L — HIGH (ref 3.5–5)
POTASSIUM SERPL-SCNC: 5.2 MMOL/L — HIGH (ref 3.5–5)
PROT SERPL-MCNC: 5.8 G/DL — LOW (ref 6–8)
RBC # BLD: 4.37 M/UL — LOW (ref 4.7–6.1)
RBC # FLD: 13.7 % — SIGNIFICANT CHANGE UP (ref 11.5–14.5)
SODIUM SERPL-SCNC: 139 MMOL/L — SIGNIFICANT CHANGE UP (ref 135–146)
T4 FREE SERPL-MCNC: 1.8 NG/DL — SIGNIFICANT CHANGE UP (ref 0.9–1.8)
TSH SERPL-MCNC: 1.81 UIU/ML — SIGNIFICANT CHANGE UP (ref 0.27–4.2)
WBC # BLD: 7.51 K/UL — SIGNIFICANT CHANGE UP (ref 4.8–10.8)
WBC # FLD AUTO: 7.51 K/UL — SIGNIFICANT CHANGE UP (ref 4.8–10.8)

## 2020-10-15 PROCEDURE — 99233 SBSQ HOSP IP/OBS HIGH 50: CPT

## 2020-10-15 RX ORDER — SODIUM CHLORIDE 9 MG/ML
500 INJECTION INTRAMUSCULAR; INTRAVENOUS; SUBCUTANEOUS ONCE
Refills: 0 | Status: COMPLETED | OUTPATIENT
Start: 2020-10-15 | End: 2020-10-15

## 2020-10-15 RX ADMIN — Medication 1 MILLIGRAM(S): at 21:40

## 2020-10-15 RX ADMIN — CLOPIDOGREL BISULFATE 75 MILLIGRAM(S): 75 TABLET, FILM COATED ORAL at 11:12

## 2020-10-15 RX ADMIN — HEPARIN SODIUM 5000 UNIT(S): 5000 INJECTION INTRAVENOUS; SUBCUTANEOUS at 05:49

## 2020-10-15 RX ADMIN — TAMSULOSIN HYDROCHLORIDE 0.8 MILLIGRAM(S): 0.4 CAPSULE ORAL at 21:40

## 2020-10-15 RX ADMIN — HEPARIN SODIUM 5000 UNIT(S): 5000 INJECTION INTRAVENOUS; SUBCUTANEOUS at 21:40

## 2020-10-15 RX ADMIN — HEPARIN SODIUM 5000 UNIT(S): 5000 INJECTION INTRAVENOUS; SUBCUTANEOUS at 14:15

## 2020-10-15 RX ADMIN — Medication 2 GRAM(S): at 05:50

## 2020-10-15 RX ADMIN — SODIUM CHLORIDE 500 MILLILITER(S): 9 INJECTION INTRAMUSCULAR; INTRAVENOUS; SUBCUTANEOUS at 12:04

## 2020-10-15 RX ADMIN — ATORVASTATIN CALCIUM 10 MILLIGRAM(S): 80 TABLET, FILM COATED ORAL at 21:40

## 2020-10-15 RX ADMIN — Medication 5 MILLIGRAM(S): at 21:41

## 2020-10-15 RX ADMIN — Medication 2 GRAM(S): at 17:38

## 2020-10-15 RX ADMIN — Medication 50 MILLIGRAM(S): at 05:48

## 2020-10-15 RX ADMIN — CITALOPRAM 20 MILLIGRAM(S): 10 TABLET, FILM COATED ORAL at 11:12

## 2020-10-15 NOTE — PROGRESS NOTE ADULT - SUBJECTIVE AND OBJECTIVE BOX
SUBJECTIVE:    Patient is a 85y old Male who presents with a chief complaint of syncope (14 Oct 2020 13:42)    Currently admitted to medicine with the primary diagnosis of Syncope       Today is hospital day 3d.     PAST MEDICAL & SURGICAL HISTORY  Chronic GERD    Hyperlipidemia, group A    Depression    Coronary artery disease    Hypertension    Presence of stent in coronary artery  x3      ALLERGIES:  No Known Allergies    MEDICATIONS:  STANDING MEDICATIONS  atorvastatin 10 milliGRAM(s) Oral at bedtime  chlorhexidine 4% Liquid 1 Application(s) Topical <User Schedule>  citalopram 20 milliGRAM(s) Oral daily  clopidogrel Tablet 75 milliGRAM(s) Oral daily  heparin   Injectable 5000 Unit(s) SubCutaneous every 8 hours  influenza   Vaccine 0.5 milliLiter(s) IntraMuscular once  melatonin 1 milliGRAM(s) Oral at bedtime  melatonin 5 milliGRAM(s) Oral at bedtime  metoprolol succinate ER 50 milliGRAM(s) Oral daily  omega-3-Acid Ethyl Esters 2 Gram(s) Oral two times a day  sodium chloride 0.9% Bolus 500 milliLiter(s) IV Bolus once  sodium chloride 0.9%. 1000 milliLiter(s) IV Continuous <Continuous>  tamsulosin 0.8 milliGRAM(s) Oral at bedtime    PRN MEDICATIONS  ALPRAZolam 0.5 milliGRAM(s) Oral at bedtime PRN    VITALS:   T(F): 98.1  HR: 65  BP: 173/82  RR: 18  SpO2: 98%    LABS:                        13.3   7.51  )-----------( 190      ( 15 Oct 2020 06:56 )             39.4     10-15    139  |  107  |  29<H>  ----------------------------<  99  5.2<H>   |  25  |  1.7<H>    Ca    9.0      15 Oct 2020 06:56  Mg     2.1     10-15    TPro  5.8<L>  /  Alb  3.6  /  TBili  0.5  /  DBili  x   /  AST  13  /  ALT  6   /  AlkPhos  71  10-15                  RADIOLOGY:    PHYSICAL EXAM:  GEN: No acute distress  LUNGS: Clear to auscultation bilaterally   HEART: S1/S2 present. RRR.   ABD/ GI: Soft, non-tender, non-distended. Bowel sounds present  EXT: No edema  NEURO: AAOX3

## 2020-10-15 NOTE — PROGRESS NOTE ADULT - SUBJECTIVE AND OBJECTIVE BOX
SUBJECTIVE:    Patient is a 85y old Male who presents with a chief complaint of syncope (15 Oct 2020 15:13)    Currently admitted to medicine with the primary diagnosis of Syncope       Today is hospital day 3d. This morning he is resting comfortably in bed and reports no new issues or overnight events.       PAST MEDICAL & SURGICAL HISTORY  Chronic GERD    Hyperlipidemia, group A    Depression    Coronary artery disease    Hypertension    Presence of stent in coronary artery  x3      SOCIAL HISTORY:  Negative for smoking/alcohol/drug use.     ALLERGIES:  No Known Allergies    MEDICATIONS:  STANDING MEDICATIONS  atorvastatin 10 milliGRAM(s) Oral at bedtime  chlorhexidine 4% Liquid 1 Application(s) Topical <User Schedule>  citalopram 20 milliGRAM(s) Oral daily  clopidogrel Tablet 75 milliGRAM(s) Oral daily  heparin   Injectable 5000 Unit(s) SubCutaneous every 8 hours  influenza   Vaccine 0.5 milliLiter(s) IntraMuscular once  melatonin 1 milliGRAM(s) Oral at bedtime  melatonin 5 milliGRAM(s) Oral at bedtime  metoprolol succinate ER 50 milliGRAM(s) Oral daily  omega-3-Acid Ethyl Esters 2 Gram(s) Oral two times a day  sodium chloride 0.9% Bolus 500 milliLiter(s) IV Bolus once  sodium chloride 0.9%. 1000 milliLiter(s) IV Continuous <Continuous>  tamsulosin 0.8 milliGRAM(s) Oral at bedtime    PRN MEDICATIONS    VITALS:   T(F): 98.1  HR: 65  BP: 173/82  RR: 18  SpO2: 98%    PHYSICAL EXAM:  GEN: NAD, lying in bed comfortably  LUNGS: Clear to auscultation bilaterally  HEART: S1/S2 present   ABD: Soft, non-tender, non-distended. Bowel sounds present.  EXT: No edema.   NEURO: AAOX3. non focal      LABS:                        13.3   7.51  )-----------( 190      ( 15 Oct 2020 06:56 )             39.4     10-15    139  |  107  |  29<H>  ----------------------------<  99  5.2<H>   |  25  |  1.7<H>    Ca    9.0      15 Oct 2020 06:56  Mg     2.1     10-15    TPro  5.8<L>  /  Alb  3.6  /  TBili  0.5  /  DBili  x   /  AST  13  /  ALT  6   /  AlkPhos  71  10-15

## 2020-10-16 VITALS — TEMPERATURE: 97 F

## 2020-10-16 LAB
ANION GAP SERPL CALC-SCNC: 8 MMOL/L — SIGNIFICANT CHANGE UP (ref 7–14)
BUN SERPL-MCNC: 29 MG/DL — HIGH (ref 10–20)
CALCIUM SERPL-MCNC: 8.8 MG/DL — SIGNIFICANT CHANGE UP (ref 8.5–10.1)
CHLORIDE SERPL-SCNC: 105 MMOL/L — SIGNIFICANT CHANGE UP (ref 98–110)
CO2 SERPL-SCNC: 24 MMOL/L — SIGNIFICANT CHANGE UP (ref 17–32)
CREAT SERPL-MCNC: 1.4 MG/DL — SIGNIFICANT CHANGE UP (ref 0.7–1.5)
GLUCOSE BLDC GLUCOMTR-MCNC: 146 MG/DL — HIGH (ref 70–99)
GLUCOSE BLDC GLUCOMTR-MCNC: 93 MG/DL — SIGNIFICANT CHANGE UP (ref 70–99)
GLUCOSE SERPL-MCNC: 98 MG/DL — SIGNIFICANT CHANGE UP (ref 70–99)
HCT VFR BLD CALC: 41.8 % — LOW (ref 42–52)
HGB BLD-MCNC: 14 G/DL — SIGNIFICANT CHANGE UP (ref 14–18)
MAGNESIUM SERPL-MCNC: 1.9 MG/DL — SIGNIFICANT CHANGE UP (ref 1.8–2.4)
MCHC RBC-ENTMCNC: 30 PG — SIGNIFICANT CHANGE UP (ref 27–31)
MCHC RBC-ENTMCNC: 33.5 G/DL — SIGNIFICANT CHANGE UP (ref 32–37)
MCV RBC AUTO: 89.5 FL — SIGNIFICANT CHANGE UP (ref 80–94)
NRBC # BLD: 0 /100 WBCS — SIGNIFICANT CHANGE UP (ref 0–0)
PLATELET # BLD AUTO: 203 K/UL — SIGNIFICANT CHANGE UP (ref 130–400)
POTASSIUM SERPL-MCNC: 4.9 MMOL/L — SIGNIFICANT CHANGE UP (ref 3.5–5)
POTASSIUM SERPL-SCNC: 4.9 MMOL/L — SIGNIFICANT CHANGE UP (ref 3.5–5)
RBC # BLD: 4.67 M/UL — LOW (ref 4.7–6.1)
RBC # FLD: 14 % — SIGNIFICANT CHANGE UP (ref 11.5–14.5)
SODIUM SERPL-SCNC: 137 MMOL/L — SIGNIFICANT CHANGE UP (ref 135–146)
WBC # BLD: 7.21 K/UL — SIGNIFICANT CHANGE UP (ref 4.8–10.8)
WBC # FLD AUTO: 7.21 K/UL — SIGNIFICANT CHANGE UP (ref 4.8–10.8)

## 2020-10-16 PROCEDURE — 99239 HOSP IP/OBS DSCHRG MGMT >30: CPT

## 2020-10-16 RX ORDER — ALFUZOSIN HYDROCHLORIDE 10 MG/1
1 TABLET, EXTENDED RELEASE ORAL
Qty: 30 | Refills: 0
Start: 2020-10-16 | End: 2020-11-14

## 2020-10-16 RX ORDER — LANOLIN ALCOHOL/MO/W.PET/CERES
1 CREAM (GRAM) TOPICAL
Qty: 30 | Refills: 0
Start: 2020-10-16 | End: 2020-11-14

## 2020-10-16 RX ORDER — LOSARTAN POTASSIUM 100 MG/1
50 TABLET, FILM COATED ORAL DAILY
Refills: 0 | Status: DISCONTINUED | OUTPATIENT
Start: 2020-10-16 | End: 2020-10-16

## 2020-10-16 RX ORDER — ALPRAZOLAM 0.25 MG
1 TABLET ORAL
Qty: 0 | Refills: 0 | DISCHARGE

## 2020-10-16 RX ORDER — TAMSULOSIN HYDROCHLORIDE 0.4 MG/1
0.8 CAPSULE ORAL
Qty: 0 | Refills: 0 | DISCHARGE

## 2020-10-16 RX ORDER — ZOLPIDEM TARTRATE 10 MG/1
1 TABLET ORAL
Qty: 0 | Refills: 0 | DISCHARGE

## 2020-10-16 RX ORDER — AMLODIPINE BESYLATE 2.5 MG/1
1 TABLET ORAL
Qty: 0 | Refills: 0 | DISCHARGE

## 2020-10-16 RX ADMIN — LOSARTAN POTASSIUM 50 MILLIGRAM(S): 100 TABLET, FILM COATED ORAL at 11:36

## 2020-10-16 RX ADMIN — HEPARIN SODIUM 5000 UNIT(S): 5000 INJECTION INTRAVENOUS; SUBCUTANEOUS at 05:41

## 2020-10-16 RX ADMIN — Medication 2 GRAM(S): at 05:43

## 2020-10-16 RX ADMIN — CITALOPRAM 20 MILLIGRAM(S): 10 TABLET, FILM COATED ORAL at 11:36

## 2020-10-16 RX ADMIN — CLOPIDOGREL BISULFATE 75 MILLIGRAM(S): 75 TABLET, FILM COATED ORAL at 11:36

## 2020-10-16 RX ADMIN — Medication 50 MILLIGRAM(S): at 05:41

## 2020-10-16 NOTE — PROGRESS NOTE ADULT - SUBJECTIVE AND OBJECTIVE BOX
SUBJECTIVE:    Patient is a 85y old Male who presents with a chief complaint of syncope (15 Oct 2020 18:03)    Currently admitted to medicine with the primary diagnosis of Syncope    Today is hospital day 4d. This morning he is resting in bed and reports no acute events overnight.       PAST MEDICAL & SURGICAL HISTORY  Chronic GERD    Hyperlipidemia, group A    Depression    Coronary artery disease    Hypertension    Presence of stent in coronary artery  x3      SOCIAL HISTORY:  Negative for smoking/alcohol/drug use.     ALLERGIES:  No Known Allergies    MEDICATIONS:  STANDING MEDICATIONS  atorvastatin 10 milliGRAM(s) Oral at bedtime  chlorhexidine 4% Liquid 1 Application(s) Topical <User Schedule>  citalopram 20 milliGRAM(s) Oral daily  clopidogrel Tablet 75 milliGRAM(s) Oral daily  heparin   Injectable 5000 Unit(s) SubCutaneous every 8 hours  influenza   Vaccine 0.5 milliLiter(s) IntraMuscular once  melatonin 5 milliGRAM(s) Oral at bedtime  metoprolol succinate ER 50 milliGRAM(s) Oral daily  omega-3-Acid Ethyl Esters 2 Gram(s) Oral two times a day  tamsulosin 0.8 milliGRAM(s) Oral at bedtime    PRN MEDICATIONS    VITALS:   T(F): 97.7  HR: 62  BP: 161/74  RR: 16  SpO2: 98%    PHYSICAL EXAM:  GEN: NAD, lying in bed comfortably  LUNGS: Clear to auscultation bilaterally  HEART: S1/S2 present   ABD: Soft, non-tender, non-distended. Bowel sounds present.  EXT: No edema. Compression stockings in place.   NEURO: AAOX3. non focal      LABS:                        14.0   7.21  )-----------( 203      ( 16 Oct 2020 07:13 )             41.8     10-16    137  |  105  |  29<H>  ----------------------------<  98  4.9   |  24  |  1.4    Ca    8.8      16 Oct 2020 07:13  Mg     1.9     10-16    TPro  5.8<L>  /  Alb  3.6  /  TBili  0.5  /  DBili  x   /  AST  13  /  ALT  6   /  AlkPhos  71  10-15

## 2020-10-16 NOTE — DISCHARGE NOTE PROVIDER - CARE PROVIDER_API CALL
Assumed care of pt. Pt AAOx4. VSS. No signs of distress. Side rails up.      Virgen Rivero)  Cardiovascular Disease; Internal Medicine  [facility] [facility], [address]  [place], [place] [id number]  Phone: [phone]  Fax: [phone]  Follow Up Time: 1 week

## 2020-10-16 NOTE — DISCHARGE NOTE PROVIDER - NSDCFUADDINST_GEN_ALL_CORE_FT
Please wear compression stocking and abdominal binder    Please follow up with your PCP in 1 week to repeat blood pressure measurements  and regarding medication changes  and regarding    Please follow up with a cardiologist regarding recommendation of loop recorder    Please follow up with your urologist regarding the medication changes     Call 911 and return to the emergency room if you have chest pain, difficulty breathing, high fevers, worsening of your symptoms, feel unwell or have nausea and vomiting.   Please wear compression stocking and abdominal binder    Please follow up with your PCP in 1 week to repeat blood pressure measurements  and regarding medication changes    Please follow up with a cardiologist regarding recommendation of loop recorder    Please follow up with your urologist regarding the medication changes     Call 911 and return to the emergency room if you have chest pain, difficulty breathing, high fevers, worsening of your symptoms, feel unwell or have nausea and vomiting.   Please wear compression stocking and abdominal binder    Please follow up with your PCP in 1 week to repeat blood pressure measurements  and regarding medication changes    Please follow up with Dr. Rivero cardiologist regarding loop recorder    Please follow up with your urologist regarding the medication changes     Call 911 and return to the emergency room if you have chest pain, difficulty breathing, high fevers, worsening of your symptoms, feel unwell or have nausea and vomiting.

## 2020-10-16 NOTE — PROGRESS NOTE ADULT - ASSESSMENT
84 yo M with PMHx of CAD s/p 3 stents, HTN, HDL, emphysema, asbestos exposure, GERD, depression, who presents s/p syncope on 10/11 fully witnessed by family, here for syncopal workup     # Syncopal episode  - 3 EKGs show NSR, 2 negative trops  - Echo showed 60-65% EF with grade I diastolic dysfunction  - orthostatics positive, etiology likely ptnts numerous meds  -compression stockings   -maintain hydrated  -decreased flomax to 0.4  -questionable hypoglycemia- obtain FS qhs and qac     # FORREST likely secondary to dehydration   -Creat 1.9 on admission --> 1.5 today   - Hold off on nephrotoxic drugs  - monitor    # HTN  - Continue toprol  -d/c amlodipine  - Hold losartan until FORREST resolves    # ACS  - Continue Plavix    # HDL  - Continue atorvastatin    # Depression  - Continue citalopram, lorazepam  -Xanax at night prn, trying to wean  -melatonin at bedtime     # GERD  - Continue PPI    # Emphysema  - Radiology recommends f/u CT in 3 months if no prior CT      DVT PPX: Heparin subq  GI PPX: protonix  Diet: DASH  Dispo: From home, pending EP eval  
84 yo M with PMHx of CAD s/p 3 stents, HTN, HDL, emphysema, asbestos exposure, GERD, depression, who presents s/p syncope on 10/11 fully witnessed by family, here for syncopal workup     # Syncopal episode- + orthostatics  - 3 EKGs show NSR, 2 negative trops  - Echo: 60-65% EF   -c/w compression stockings   -ptnt education  -EP rec loop recorder to r/o arrhythmia prior to discharge?    # HTN  -c/w toprol 50 qd  -d/cd amlodipine 5mg qd   -restarted on home Losartan 50qd     #BPH s/p urolift procedure on 10/1/20  -Spoke with urologist Dr. Elmer Ambrosio who recommended changing to Alfuzosin 10mg qd and d/c Flomax  -F/u outptnt with Dr. Ambrosio     # FORREST likely secondary to dehydration- improved  -Creat 1.9 on admission --> 1.4 today   - restarted on home losartan    # ACS: c/w home Plavix    # HLD: c/w home atorvastatin    # Depression  - Continue citalopram and lorazepam  -d/cd Xanax at night prn, trying to wean off  -melatonin at bedtime     # GERD: c/w PPI    # Emphysema: Radiology recommends f/u CT in 3 months if no prior CT    DVT PPX: Heparin subq  GI PPX: protonix  Diet: DASH  
86 yo M with PMHx of CAD s/p 3 stents, HTN, HDL, emphysema, asbestos exposure, GERD, depression, who presents s/p syncope on 10/11 fully witnessed by family, here for syncopal workup     # Syncopal episode  - 3 EKGs show NSR, 2 negative trops  - Echo showed 60-65% EF with grade I diastolic dysfunction  - orthostatics positive, etiology possbibly ptnts numerous meds  -continue compression stockings   -orthostatics positive despite multiple IVF boluses   -Attempted to decrease Flomax to 0.4, ptnt refused w/o speaking to Urologist who started ptnt on Flomax on 10/1/20 after Urolift procedure on that date, speak with urologist Dr. Elmer Ambrosio [phone]/ [phone]  -questionable hypoglycemia- obtain FS qhs and qac   -EP rec loop recorder to r/o arrhythmia prior to discharge    # FORREST likely secondary to dehydration   -Creat 1.9 on admission --> 1.7 today uptrending   - Hold off on nephrotoxic drugs, holding losartan  - monitor    # HTN  - Continue toprol  -d/c amlodipine  - continue holding losartan until FORREST resolves    # ACS  - Continue Plavix    # HDL  - Continue atorvastatin    # Depression  - Continue citalopram, lorazepam  -d/c Xanax at night prn, trying to wean off  -melatonin at bedtime     # GERD  - Continue PPI    # Emphysema  - Radiology recommends f/u CT in 3 months if no prior CT      DVT PPX: Heparin subq  GI PPX: protonix  Diet: DASH  
86 yo M with PMHx of CAD s/p 3 stents, HTN, HDL, emphysema, asbestos exposure, GERD, depression, who presents s/p syncope on 10/11 fully witnessed by family, here for syncopal workup     # Syncopal episode  - Echo showed 60-65% EF with grade I diastolic dysfunction  - orthostatics positive,likely sec to overmedication-- did not improve with second bolus.  -compression stockings   -maintain hydrated  -decreased flomax to 0.4mg to be discussed further with his urologist as patient is resistant to lowering dose of flomax  -  # FORREST likely secondary to dehydration   -Creat 1.9 on admission --> 1.7 today   -held losartan and now worse again    # HTN  - Continue toprol  -- Hold losartan  and amlodipine    # CAD- Continue Plavix    # HDL  - Continue atorvastatin    #Anxiety  - Continue citalopram,-will Dc xanax got it last night.-melatonin at bedtime     # GERD  - Continue PPI    # Emphysema  - stable    DVT PPX: Heparin subq  GI PPX: protonix  Diet: DASH  Dispo: Dc plan when not orthostatic.      
85 M PMH CAD s/p 3 stents, HTN, DLD, emphysema, asbestos exposure, GERD, depression, who presents s/p syncope 19:00 on 10/11. Syncope occured post pradnial, and patient was orthostatic as per granddaughter. Event witnessed by family who reported no seizure-like activity.In ED EKGs showed NSR. CT showed emphysema and asbestos plaques. He received 2 L NS and was admitted for syncopal w/u.    IMPRESSION  Syncope; likely vasovagal vs dehydration. Cannot exclude cardiac cause.   SHAYLEE on admission  Emphysema and Asbestos Plaques    #Syncope in patient with sig cardiac history-3 EKGs show NSR, 2 negative trops; no AS on ECHO   -tele monitoring  -f/u EP reccs  -possible loop recorder denise  -orthostatic vitals  -EP recc cardio eval    #SHAYLEE on admission-resolving   -gentle hydration   -improved after bolus. Patient syncopal episode might have been secondary to dehydration    #HTN-c/w toprol, amlodipine. Hold Losartan in light of Shaylee    #ACS/DLD history-c/w plavix/statin  #Depression-Continue citalopram, lorazepam. Continue Xanax PRN  #GERD-c/w PPI  #Emphyesema w/ asbestos plaques-op f/u      ----------------------------------------------------------------------  DVT PPX: Heparin subq  GI PPX: protonix  Diet: DASH  Dispo: From home, pending EP eval. loop denise?

## 2020-10-16 NOTE — DISCHARGE NOTE PROVIDER - HOSPITAL COURSE
The patient is an 85 year old male with PMHx of CAD s/p 3 stents, HTN, HDL, emphysema, asbestos exposure, GERD, depression, who presents s/p syncope 19:00 on 10/11. Per patient he was sitting at dinner table and lost consciousness for 1 minute. He had no preceding symptoms at all, and when he woke up he felt "weak" with no other symptoms. He states the EMTs said his "blood pressure was low". He recalls sitting at table and then waking up surrounded by people. His family witnessed the whole episode; they state he suddenly did not answer them and slumped in his chair. They report no seizure activity, posttictal state, no urinary/bowel incontinence. Of note, he reports another episode of dizziness that he felt yesterday afternoon that resolved spontaneously after 2 minutes.   He denies recent illness, CP, SOB, abd pain, nausea, vomiting, diarrhea at any time.    ED course: Vitals remained WNL. EKGs showed NSR. CT showed emphysema and asbestos plaques. He received 2 L NS and was admitted for syncopal w/u.     Orthostatics were positive throughout admission, patient received multiple fluid boluses. The patient was continuously monitored on telemetry without any events. Troponins were negative. An echo was performed which showed an EF of 60-65%. Amlodipine, Xanax and Ambien were discontinued, after consulting with patient's urologist Dr. Elmer Ambrosio, Flomax was discontinued and Alfuzosin 10mg qd was started on discharge. The patient will follow up with his urologist outpatient. Patient was seen by cardiology who recommended loop recorder, will follow up outpatient. A CT chest was performed which showed bilateral calcified pleural plaques can be seen in prior asbestos exposure. Curvilinear opacity/consolidative change in the left lower lobe has the appearance favoring rounded atelectasis. Given no prior studies for comparison, 3 month follow-up is recommended to ensure stability.    Patient was educated on importance of getting out of bed slowly, remaining hydrated and usage of compression stockings.            The patient is an 85 year old male with PMHx of CAD s/p 3 stents, HTN, HDL, emphysema, asbestos exposure, GERD, depression, who presents s/p syncope 19:00 on 10/11. Per patient he was sitting at dinner table and lost consciousness for 1 minute. He had no preceding symptoms at all, and when he woke up he felt "weak" with no other symptoms. He states the EMTs said his "blood pressure was low". He recalls sitting at table and then waking up surrounded by people. His family witnessed the whole episode; they state he suddenly did not answer them and slumped in his chair. They report no seizure activity, posttictal state, no urinary/bowel incontinence. Of note, he reports another episode of dizziness that he felt yesterday afternoon that resolved spontaneously after 2 minutes.   He denies recent illness, CP, SOB, abd pain, nausea, vomiting, diarrhea at any time.    ED course: Vitals remained WNL. EKGs showed NSR. CT showed emphysema and asbestos plaques. He received 2 L NS and was admitted for syncopal w/u.     Orthostatics were positive throughout admission, patient received multiple fluid boluses. The patient was continuously monitored on telemetry without any events. Troponins were negative. An echo was performed which showed an EF of 60-65%. Amlodipine, Xanax and Ambien were discontinued, after consulting with patient's urologist Dr. Elmer Ambrosio, Flomax was discontinued and Alfuzosin 10mg qd was started on discharge. The patient will follow up with his urologist outpatient. Patient was seen by cardiology who recommended loop recorder, will follow up outpatient with Dr. Rivero.  A CT chest was performed which showed bilateral calcified pleural plaques can be seen in prior asbestos exposure. Curvilinear opacity/consolidative change in the left lower lobe has the appearance favoring rounded atelectasis. Given no prior studies for comparison, 3 month follow-up is recommended to ensure stability.    Patient was educated on importance of getting out of bed slowly, remaining hydrated and usage of compression stockings.

## 2020-10-16 NOTE — PROGRESS NOTE ADULT - ATTENDING COMMENTS
Patient seen and examined independently. Agree with resident note.  # orthostatic hypotension-- restarted losartan as baseline Bp is high and as per urologist Alfuzosin to be given instead of flomax 0.8mg.  Patient ambulated on floor.  # Shaylee resolved.  Patient is being discharged today--spent more than 30mins.

## 2020-10-16 NOTE — DISCHARGE NOTE PROVIDER - NSDCMRMEDTOKEN_GEN_ALL_CORE_FT
alfuzosin 10 mg oral tablet, extended release: 1 tab(s) orally once a day (at bedtime)   ALPRAZolam 0.5 mg oral tablet: 1 tab(s) orally 3 times a day, As Needed  amLODIPine 5 mg oral tablet: 1 tab(s) orally once a day  atorvastatin 10 mg oral tablet: 1 tab(s) orally 3 to 4 times a week  citalopram 20 mg oral tablet: 1 tab(s) orally once a day  Flomax: 0.8 milligram(s) orally once a day  folic acid 0.8 mg oral tablet: 1 tab(s) orally once a day  LORazepam 0.5 mg oral tablet: 1 tab(s) orally once a day (at bedtime)  losartan 50 mg oral tablet: 1 tab(s) orally once a day  metoprolol succinate 50 mg oral tablet, extended release: 1 tab(s) orally once a day  Omega-3 350 mg oral capsule: 1 cap(s) orally once a day  pantoprazole 40 mg oral delayed release tablet: 1 tab(s) orally once a day  Plavix 75 mg oral tablet: 1 tab(s) orally once a day  zolpidem 5 mg oral tablet: 1 tab(s) orally once a day (at bedtime)   alfuzosin 10 mg oral tablet, extended release: 1 tab(s) orally once a day (at bedtime)   atorvastatin 10 mg oral tablet: 1 tab(s) orally 3 to 4 times a week  citalopram 20 mg oral tablet: 1 tab(s) orally once a day  folic acid 0.8 mg oral tablet: 1 tab(s) orally once a day  losartan 50 mg oral tablet: 1 tab(s) orally once a day  Melatonin 5 mg oral tablet: 1 tab(s) orally once a day (at bedtime)  metoprolol succinate 50 mg oral tablet, extended release: 1 tab(s) orally once a day  Omega-3 350 mg oral capsule: 1 cap(s) orally once a day  pantoprazole 40 mg oral delayed release tablet: 1 tab(s) orally once a day  Plavix 75 mg oral tablet: 1 tab(s) orally once a day

## 2020-10-16 NOTE — DISCHARGE NOTE NURSING/CASE MANAGEMENT/SOCIAL WORK - PATIENT PORTAL LINK FT
You can access the FollowMyHealth Patient Portal offered by [facility] by registering at the following website: [email or web address]. By joining [facility]’s FollowMyHealth portal, you will also be able to view your health information using other applications (apps) compatible with our system.

## 2020-10-16 NOTE — DISCHARGE NOTE PROVIDER - NSDCCPCAREPLAN_GEN_ALL_CORE_FT
PRINCIPAL DISCHARGE DIAGNOSIS  Diagnosis: Orthostatic hypotension  Assessment and Plan of Treatment: Orthostatic hypotension is a drop in blood pressure that can happen to some people when they stand up. This drop in blood pressure can make you feel dizzy or lightheaded. It can even make you pass out. Another term for orthostatic hypotension is "postural hypotension."  The symptoms all happen when you stand up after sitting or lying down. They can include: Feeling lightheaded or dizzy, Blurry or dim vision, Weakness, and Fainting (this is called syncope).   Stand up slowly and give your body time to adapt. This is especially important when you get out of bed in the morning. Start by sitting up and waiting a moment. Then swing your legs over the side of the bed and wait some more. When you do stand up, make sure you have something to hold onto in case you start to feel dizzy.  Avoid running, hiking, or doing anything that takes a lot of energy in hot weather. These things can make orthostatic hypotension worse.  Make sure you drink enough fluids, especially in hot weather.  Put blocks under the posts at the head of your bed. This will raise your head above your heart slightly.  Wear "compression" stockings. The ones that go to your waist are most helpful, but they can be hard to use.  Avoid drinking a lot of alcohol.

## 2020-10-20 DIAGNOSIS — Z95.5 PRESENCE OF CORONARY ANGIOPLASTY IMPLANT AND GRAFT: ICD-10-CM

## 2020-10-20 DIAGNOSIS — K21.9 GASTRO-ESOPHAGEAL REFLUX DISEASE WITHOUT ESOPHAGITIS: ICD-10-CM

## 2020-10-20 DIAGNOSIS — J43.9 EMPHYSEMA, UNSPECIFIED: ICD-10-CM

## 2020-10-20 DIAGNOSIS — J92.0 PLEURAL PLAQUE WITH PRESENCE OF ASBESTOS: ICD-10-CM

## 2020-10-20 DIAGNOSIS — I95.1 ORTHOSTATIC HYPOTENSION: ICD-10-CM

## 2020-10-20 DIAGNOSIS — F32.9 MAJOR DEPRESSIVE DISORDER, SINGLE EPISODE, UNSPECIFIED: ICD-10-CM

## 2020-10-20 DIAGNOSIS — R55 SYNCOPE AND COLLAPSE: ICD-10-CM

## 2020-10-20 DIAGNOSIS — N40.0 BENIGN PROSTATIC HYPERPLASIA WITHOUT LOWER URINARY TRACT SYMPTOMS: ICD-10-CM

## 2020-10-20 DIAGNOSIS — Z77.090 CONTACT WITH AND (SUSPECTED) EXPOSURE TO ASBESTOS: ICD-10-CM

## 2020-10-20 DIAGNOSIS — N17.9 ACUTE KIDNEY FAILURE, UNSPECIFIED: ICD-10-CM

## 2020-10-20 DIAGNOSIS — I10 ESSENTIAL (PRIMARY) HYPERTENSION: ICD-10-CM

## 2020-10-20 DIAGNOSIS — J98.11 ATELECTASIS: ICD-10-CM

## 2020-10-20 DIAGNOSIS — I25.10 ATHEROSCLEROTIC HEART DISEASE OF NATIVE CORONARY ARTERY WITHOUT ANGINA PECTORIS: ICD-10-CM

## 2020-10-20 DIAGNOSIS — T44.6X5A ADVERSE EFFECT OF ALPHA-ADRENORECEPTOR ANTAGONISTS, INITIAL ENCOUNTER: ICD-10-CM

## 2020-10-20 DIAGNOSIS — Z79.02 LONG TERM (CURRENT) USE OF ANTITHROMBOTICS/ANTIPLATELETS: ICD-10-CM

## 2020-10-20 DIAGNOSIS — F41.9 ANXIETY DISORDER, UNSPECIFIED: ICD-10-CM

## 2020-10-20 DIAGNOSIS — E86.0 DEHYDRATION: ICD-10-CM
